# Patient Record
Sex: FEMALE | Race: WHITE | NOT HISPANIC OR LATINO | Employment: OTHER | URBAN - METROPOLITAN AREA
[De-identification: names, ages, dates, MRNs, and addresses within clinical notes are randomized per-mention and may not be internally consistent; named-entity substitution may affect disease eponyms.]

---

## 2019-02-25 ENCOUNTER — HISTORICAL (OUTPATIENT)
Dept: ADMINISTRATIVE | Facility: HOSPITAL | Age: 74
End: 2019-02-25

## 2021-03-12 ENCOUNTER — HISTORICAL (OUTPATIENT)
Dept: ADMINISTRATIVE | Facility: HOSPITAL | Age: 76
End: 2021-03-12

## 2022-03-18 ENCOUNTER — HISTORICAL (OUTPATIENT)
Dept: ADMINISTRATIVE | Facility: HOSPITAL | Age: 77
End: 2022-03-18

## 2022-06-23 ENCOUNTER — HOSPITAL ENCOUNTER (INPATIENT)
Facility: HOSPITAL | Age: 77
LOS: 6 days | Discharge: OTHER INSTITUTION | DRG: 639 | End: 2022-06-30
Attending: EMERGENCY MEDICINE | Admitting: INTERNAL MEDICINE
Payer: MEDICARE

## 2022-06-23 DIAGNOSIS — R73.9 HYPERGLYCEMIA: Primary | ICD-10-CM

## 2022-06-23 DIAGNOSIS — E11.65 UNCONTROLLED TYPE 2 DIABETES MELLITUS WITH HYPERGLYCEMIA: ICD-10-CM

## 2022-06-23 LAB
ALBUMIN SERPL-MCNC: 3.6 GM/DL (ref 3.4–4.8)
ALBUMIN/GLOB SERPL: 1 RATIO (ref 1.1–2)
ALP SERPL-CCNC: 159 UNIT/L (ref 40–150)
ALT SERPL-CCNC: 49 UNIT/L (ref 0–55)
APPEARANCE UR: CLEAR
AST SERPL-CCNC: 37 UNIT/L (ref 5–34)
BACTERIA #/AREA URNS AUTO: ABNORMAL /HPF
BASOPHILS # BLD AUTO: 0.06 X10(3)/MCL (ref 0–0.2)
BASOPHILS NFR BLD AUTO: 0.9 %
BILIRUB UR QL STRIP.AUTO: NEGATIVE MG/DL
BILIRUBIN DIRECT+TOT PNL SERPL-MCNC: 0.5 MG/DL
BUN SERPL-MCNC: 19.2 MG/DL (ref 9.8–20.1)
CALCIUM SERPL-MCNC: 9.8 MG/DL (ref 8.4–10.2)
CHLORIDE SERPL-SCNC: 89 MMOL/L (ref 98–107)
CO2 SERPL-SCNC: 25 MMOL/L (ref 23–31)
COLOR UR AUTO: YELLOW
CREAT SERPL-MCNC: 1.29 MG/DL (ref 0.55–1.02)
EOSINOPHIL # BLD AUTO: 0.05 X10(3)/MCL (ref 0–0.9)
EOSINOPHIL NFR BLD AUTO: 0.8 %
ERYTHROCYTE [DISTWIDTH] IN BLOOD BY AUTOMATED COUNT: 13 % (ref 11.5–17)
GLOBULIN SER-MCNC: 3.6 GM/DL (ref 2.4–3.5)
GLUCOSE SERPL-MCNC: 771 MG/DL (ref 82–115)
GLUCOSE UR QL STRIP.AUTO: ABNORMAL MG/DL
HCT VFR BLD AUTO: 42.5 % (ref 37–47)
HGB BLD-MCNC: 14 GM/DL (ref 12–16)
IMM GRANULOCYTES # BLD AUTO: 0.02 X10(3)/MCL (ref 0–0.02)
IMM GRANULOCYTES NFR BLD AUTO: 0.3 % (ref 0–0.43)
KETONES UR QL STRIP.AUTO: NEGATIVE MG/DL
LEUKOCYTE ESTERASE UR QL STRIP.AUTO: NEGATIVE UNIT/L
LYMPHOCYTES # BLD AUTO: 2.72 X10(3)/MCL (ref 0.6–4.6)
LYMPHOCYTES NFR BLD AUTO: 41.7 %
MCH RBC QN AUTO: 29.4 PG (ref 27–31)
MCHC RBC AUTO-ENTMCNC: 32.9 MG/DL (ref 33–36)
MCV RBC AUTO: 89.1 FL (ref 80–94)
MONOCYTES # BLD AUTO: 0.81 X10(3)/MCL (ref 0.1–1.3)
MONOCYTES NFR BLD AUTO: 12.4 %
NEUTROPHILS # BLD AUTO: 2.9 X10(3)/MCL (ref 2.1–9.2)
NEUTROPHILS NFR BLD AUTO: 43.9 %
NITRITE UR QL STRIP.AUTO: NEGATIVE
NRBC BLD AUTO-RTO: 0 %
PH UR STRIP.AUTO: 6.5 [PH]
PLATELET # BLD AUTO: 287 X10(3)/MCL (ref 130–400)
PMV BLD AUTO: 11 FL (ref 9.4–12.4)
POCT GLUCOSE: >500 MG/DL (ref 70–110)
POTASSIUM SERPL-SCNC: 4.2 MMOL/L (ref 3.5–5.1)
PROT SERPL-MCNC: 7.2 GM/DL (ref 5.8–7.6)
PROT UR QL STRIP.AUTO: NEGATIVE MG/DL
RBC # BLD AUTO: 4.77 X10(6)/MCL (ref 4.2–5.4)
RBC #/AREA URNS AUTO: <5 /HPF
RBC UR QL AUTO: NEGATIVE UNIT/L
SODIUM SERPL-SCNC: 127 MMOL/L (ref 136–145)
SP GR UR STRIP.AUTO: 1.02 (ref 1–1.03)
SQUAMOUS #/AREA URNS AUTO: <5 /HPF
UROBILINOGEN UR STRIP-ACNC: 0.2 MG/DL
WBC # SPEC AUTO: 6.5 X10(3)/MCL (ref 4.5–11.5)
WBC #/AREA URNS AUTO: 10 /HPF

## 2022-06-23 PROCEDURE — 63600175 PHARM REV CODE 636 W HCPCS: Performed by: EMERGENCY MEDICINE

## 2022-06-23 PROCEDURE — 85025 COMPLETE CBC W/AUTO DIFF WBC: CPT | Performed by: EMERGENCY MEDICINE

## 2022-06-23 PROCEDURE — 25000003 PHARM REV CODE 250: Performed by: EMERGENCY MEDICINE

## 2022-06-23 PROCEDURE — 36415 COLL VENOUS BLD VENIPUNCTURE: CPT | Performed by: INTERNAL MEDICINE

## 2022-06-23 PROCEDURE — 80053 COMPREHEN METABOLIC PANEL: CPT | Performed by: EMERGENCY MEDICINE

## 2022-06-23 PROCEDURE — 96361 HYDRATE IV INFUSION ADD-ON: CPT

## 2022-06-23 PROCEDURE — 96374 THER/PROPH/DIAG INJ IV PUSH: CPT

## 2022-06-23 PROCEDURE — 83036 HEMOGLOBIN GLYCOSYLATED A1C: CPT | Performed by: INTERNAL MEDICINE

## 2022-06-23 PROCEDURE — 82962 GLUCOSE BLOOD TEST: CPT

## 2022-06-23 PROCEDURE — 36415 COLL VENOUS BLD VENIPUNCTURE: CPT | Performed by: EMERGENCY MEDICINE

## 2022-06-23 PROCEDURE — 96372 THER/PROPH/DIAG INJ SC/IM: CPT | Performed by: EMERGENCY MEDICINE

## 2022-06-23 PROCEDURE — 99285 EMERGENCY DEPT VISIT HI MDM: CPT | Mod: 25

## 2022-06-23 PROCEDURE — 81001 URINALYSIS AUTO W/SCOPE: CPT | Performed by: EMERGENCY MEDICINE

## 2022-06-23 RX ORDER — SODIUM CHLORIDE, SODIUM LACTATE, POTASSIUM CHLORIDE, CALCIUM CHLORIDE 600; 310; 30; 20 MG/100ML; MG/100ML; MG/100ML; MG/100ML
500 INJECTION, SOLUTION INTRAVENOUS
Status: COMPLETED | OUTPATIENT
Start: 2022-06-23 | End: 2022-06-24

## 2022-06-23 RX ORDER — SODIUM CHLORIDE 9 MG/ML
500 INJECTION, SOLUTION INTRAVENOUS
Status: COMPLETED | OUTPATIENT
Start: 2022-06-23 | End: 2022-06-23

## 2022-06-23 RX ADMIN — SODIUM CHLORIDE 500 ML: 9 INJECTION, SOLUTION INTRAVENOUS at 08:06

## 2022-06-23 RX ADMIN — INSULIN HUMAN 10 UNITS: 100 INJECTION, SOLUTION PARENTERAL at 08:06

## 2022-06-23 RX ADMIN — SODIUM CHLORIDE, POTASSIUM CHLORIDE, SODIUM LACTATE AND CALCIUM CHLORIDE 500 ML: 600; 310; 30; 20 INJECTION, SOLUTION INTRAVENOUS at 10:06

## 2022-06-23 RX ADMIN — INSULIN HUMAN 20 UNITS: 100 INJECTION, SOLUTION PARENTERAL at 10:06

## 2022-06-24 PROBLEM — R73.9 HYPERGLYCEMIA: Status: ACTIVE | Noted: 2022-06-24

## 2022-06-24 LAB
ALBUMIN SERPL-MCNC: 3.4 GM/DL (ref 3.4–4.8)
ALBUMIN/GLOB SERPL: 1.1 RATIO (ref 1.1–2)
ALP SERPL-CCNC: 140 UNIT/L (ref 40–150)
ALT SERPL-CCNC: 44 UNIT/L (ref 0–55)
AST SERPL-CCNC: 41 UNIT/L (ref 5–34)
BASOPHILS # BLD AUTO: 0.04 X10(3)/MCL (ref 0–0.2)
BASOPHILS NFR BLD AUTO: 0.6 %
BILIRUBIN DIRECT+TOT PNL SERPL-MCNC: 0.5 MG/DL
BUN SERPL-MCNC: 14 MG/DL (ref 9.8–20.1)
CALCIUM SERPL-MCNC: 9.4 MG/DL (ref 8.4–10.2)
CHLORIDE SERPL-SCNC: 103 MMOL/L (ref 98–107)
CO2 SERPL-SCNC: 23 MMOL/L (ref 23–31)
CREAT SERPL-MCNC: 0.77 MG/DL (ref 0.55–1.02)
EOSINOPHIL # BLD AUTO: 0.13 X10(3)/MCL (ref 0–0.9)
EOSINOPHIL NFR BLD AUTO: 1.9 %
ERYTHROCYTE [DISTWIDTH] IN BLOOD BY AUTOMATED COUNT: 13.1 % (ref 11.5–17)
EST. AVERAGE GLUCOSE BLD GHB EST-MCNC: 349.4 MG/DL
GLOBULIN SER-MCNC: 3.2 GM/DL (ref 2.4–3.5)
GLUCOSE SERPL-MCNC: 276 MG/DL (ref 82–115)
HBA1C MFR BLD: 13.8 %
HCT VFR BLD AUTO: 42.5 % (ref 37–47)
HGB BLD-MCNC: 14.3 GM/DL (ref 12–16)
IMM GRANULOCYTES # BLD AUTO: 0.03 X10(3)/MCL (ref 0–0.02)
IMM GRANULOCYTES NFR BLD AUTO: 0.4 % (ref 0–0.43)
LYMPHOCYTES # BLD AUTO: 2.54 X10(3)/MCL (ref 0.6–4.6)
LYMPHOCYTES NFR BLD AUTO: 36.4 %
MCH RBC QN AUTO: 29.4 PG (ref 27–31)
MCHC RBC AUTO-ENTMCNC: 33.6 MG/DL (ref 33–36)
MCV RBC AUTO: 87.4 FL (ref 80–94)
MONOCYTES # BLD AUTO: 0.77 X10(3)/MCL (ref 0.1–1.3)
MONOCYTES NFR BLD AUTO: 11 %
NEUTROPHILS # BLD AUTO: 3.5 X10(3)/MCL (ref 2.1–9.2)
NEUTROPHILS NFR BLD AUTO: 49.7 %
NRBC BLD AUTO-RTO: 0 %
PLATELET # BLD AUTO: 267 X10(3)/MCL (ref 130–400)
PMV BLD AUTO: 10.9 FL (ref 9.4–12.4)
POCT GLUCOSE: 223 MG/DL (ref 70–110)
POCT GLUCOSE: 233 MG/DL (ref 70–110)
POCT GLUCOSE: 303 MG/DL (ref 70–110)
POCT GLUCOSE: 367 MG/DL (ref 70–110)
POCT GLUCOSE: 400 MG/DL (ref 70–110)
POCT GLUCOSE: 95 MG/DL (ref 70–110)
POCT GLUCOSE: >500 MG/DL (ref 70–110)
POCT GLUCOSE: >500 MG/DL (ref 70–110)
POTASSIUM SERPL-SCNC: 4.1 MMOL/L (ref 3.5–5.1)
PROT SERPL-MCNC: 6.6 GM/DL (ref 5.8–7.6)
RBC # BLD AUTO: 4.86 X10(6)/MCL (ref 4.2–5.4)
SARS-COV-2 RDRP RESP QL NAA+PROBE: NEGATIVE
SODIUM SERPL-SCNC: 137 MMOL/L (ref 136–145)
WBC # SPEC AUTO: 7 X10(3)/MCL (ref 4.5–11.5)

## 2022-06-24 PROCEDURE — 87635 SARS-COV-2 COVID-19 AMP PRB: CPT | Performed by: INTERNAL MEDICINE

## 2022-06-24 PROCEDURE — 25000003 PHARM REV CODE 250: Performed by: INTERNAL MEDICINE

## 2022-06-24 PROCEDURE — 63600175 PHARM REV CODE 636 W HCPCS: Performed by: INTERNAL MEDICINE

## 2022-06-24 PROCEDURE — 85025 COMPLETE CBC W/AUTO DIFF WBC: CPT | Performed by: INTERNAL MEDICINE

## 2022-06-24 PROCEDURE — 80053 COMPREHEN METABOLIC PANEL: CPT | Performed by: INTERNAL MEDICINE

## 2022-06-24 PROCEDURE — C9399 UNCLASSIFIED DRUGS OR BIOLOG: HCPCS | Performed by: INTERNAL MEDICINE

## 2022-06-24 PROCEDURE — 11000001 HC ACUTE MED/SURG PRIVATE ROOM

## 2022-06-24 PROCEDURE — 36415 COLL VENOUS BLD VENIPUNCTURE: CPT | Performed by: INTERNAL MEDICINE

## 2022-06-24 RX ORDER — ACETAMINOPHEN 325 MG/1
650 TABLET ORAL EVERY 8 HOURS PRN
Status: DISCONTINUED | OUTPATIENT
Start: 2022-06-24 | End: 2022-06-30 | Stop reason: HOSPADM

## 2022-06-24 RX ORDER — IBUPROFEN 200 MG
24 TABLET ORAL
Status: DISCONTINUED | OUTPATIENT
Start: 2022-06-24 | End: 2022-06-24

## 2022-06-24 RX ORDER — PRAMIPEXOLE DIHYDROCHLORIDE 0.75 MG/1
0.75 TABLET ORAL 3 TIMES DAILY
COMMUNITY

## 2022-06-24 RX ORDER — SODIUM CHLORIDE 0.9 % (FLUSH) 0.9 %
10 SYRINGE (ML) INJECTION
Status: DISCONTINUED | OUTPATIENT
Start: 2022-06-24 | End: 2022-06-30 | Stop reason: HOSPADM

## 2022-06-24 RX ORDER — TRAZODONE HYDROCHLORIDE 50 MG/1
50 TABLET ORAL NIGHTLY
COMMUNITY

## 2022-06-24 RX ORDER — ACETAMINOPHEN, DIPHENHYDRAMINE HCL, PHENYLEPHRINE HCL 325; 25; 5 MG/1; MG/1; MG/1
TABLET ORAL
COMMUNITY

## 2022-06-24 RX ORDER — TRAZODONE HYDROCHLORIDE 50 MG/1
50 TABLET ORAL NIGHTLY
Status: DISCONTINUED | OUTPATIENT
Start: 2022-06-24 | End: 2022-06-30 | Stop reason: HOSPADM

## 2022-06-24 RX ORDER — DEXTROSE MONOHYDRATE 100 MG/ML
12.5 INJECTION, SOLUTION INTRAVENOUS
Status: DISCONTINUED | OUTPATIENT
Start: 2022-06-24 | End: 2022-06-24

## 2022-06-24 RX ORDER — GLUCAGON 1 MG
1 KIT INJECTION
Status: DISCONTINUED | OUTPATIENT
Start: 2022-06-24 | End: 2022-06-24

## 2022-06-24 RX ORDER — DEXTROSE MONOHYDRATE 100 MG/ML
25 INJECTION, SOLUTION INTRAVENOUS
Status: DISCONTINUED | OUTPATIENT
Start: 2022-06-24 | End: 2022-06-30 | Stop reason: HOSPADM

## 2022-06-24 RX ORDER — CITALOPRAM 20 MG/1
40 TABLET, FILM COATED ORAL DAILY
Status: DISCONTINUED | OUTPATIENT
Start: 2022-06-24 | End: 2022-06-30 | Stop reason: HOSPADM

## 2022-06-24 RX ORDER — INSULIN ASPART 100 [IU]/ML
INJECTION, SOLUTION INTRAVENOUS; SUBCUTANEOUS
Status: CANCELLED | OUTPATIENT
Start: 2022-06-25

## 2022-06-24 RX ORDER — TALC
6 POWDER (GRAM) TOPICAL NIGHTLY PRN
Status: DISCONTINUED | OUTPATIENT
Start: 2022-06-24 | End: 2022-06-30 | Stop reason: HOSPADM

## 2022-06-24 RX ORDER — INSULIN ASPART 100 [IU]/ML
1-10 INJECTION, SOLUTION INTRAVENOUS; SUBCUTANEOUS
Status: DISCONTINUED | OUTPATIENT
Start: 2022-06-24 | End: 2022-06-30 | Stop reason: HOSPADM

## 2022-06-24 RX ORDER — IBUPROFEN 200 MG
16 TABLET ORAL
Status: DISCONTINUED | OUTPATIENT
Start: 2022-06-24 | End: 2022-06-24

## 2022-06-24 RX ORDER — ENOXAPARIN SODIUM 100 MG/ML
40 INJECTION SUBCUTANEOUS EVERY 24 HOURS
Status: DISCONTINUED | OUTPATIENT
Start: 2022-06-24 | End: 2022-06-30 | Stop reason: HOSPADM

## 2022-06-24 RX ORDER — GLUCAGON 1 MG
1 KIT INJECTION
Status: DISCONTINUED | OUTPATIENT
Start: 2022-06-24 | End: 2022-06-30 | Stop reason: HOSPADM

## 2022-06-24 RX ORDER — CITALOPRAM 40 MG/1
40 TABLET, FILM COATED ORAL DAILY
COMMUNITY

## 2022-06-24 RX ORDER — PANTOPRAZOLE SODIUM 40 MG/1
40 TABLET, DELAYED RELEASE ORAL DAILY
Status: DISCONTINUED | OUTPATIENT
Start: 2022-06-24 | End: 2022-06-30 | Stop reason: HOSPADM

## 2022-06-24 RX ORDER — DEXTROSE MONOHYDRATE 100 MG/ML
12.5 INJECTION, SOLUTION INTRAVENOUS
Status: DISCONTINUED | OUTPATIENT
Start: 2022-06-24 | End: 2022-06-30 | Stop reason: HOSPADM

## 2022-06-24 RX ORDER — HYDROXYZINE HYDROCHLORIDE 25 MG/1
25 TABLET, FILM COATED ORAL 3 TIMES DAILY PRN
COMMUNITY

## 2022-06-24 RX ORDER — INSULIN ASPART 100 [IU]/ML
1-10 INJECTION, SOLUTION INTRAVENOUS; SUBCUTANEOUS
Status: DISCONTINUED | OUTPATIENT
Start: 2022-06-24 | End: 2022-06-24

## 2022-06-24 RX ORDER — PANTOPRAZOLE SODIUM 40 MG/1
40 TABLET, DELAYED RELEASE ORAL DAILY
COMMUNITY

## 2022-06-24 RX ORDER — PRAMIPEXOLE DIHYDROCHLORIDE 0.25 MG/1
0.75 TABLET ORAL 3 TIMES DAILY
Status: DISCONTINUED | OUTPATIENT
Start: 2022-06-24 | End: 2022-06-30 | Stop reason: HOSPADM

## 2022-06-24 RX ORDER — DEXTROSE MONOHYDRATE 100 MG/ML
25 INJECTION, SOLUTION INTRAVENOUS
Status: DISCONTINUED | OUTPATIENT
Start: 2022-06-24 | End: 2022-06-24

## 2022-06-24 RX ORDER — SODIUM CHLORIDE, SODIUM LACTATE, POTASSIUM CHLORIDE, CALCIUM CHLORIDE 600; 310; 30; 20 MG/100ML; MG/100ML; MG/100ML; MG/100ML
INJECTION, SOLUTION INTRAVENOUS CONTINUOUS
Status: ACTIVE | OUTPATIENT
Start: 2022-06-24 | End: 2022-06-24

## 2022-06-24 RX ORDER — IBUPROFEN 200 MG
24 TABLET ORAL
Status: DISCONTINUED | OUTPATIENT
Start: 2022-06-24 | End: 2022-06-30 | Stop reason: HOSPADM

## 2022-06-24 RX ORDER — IBUPROFEN 200 MG
16 TABLET ORAL
Status: DISCONTINUED | OUTPATIENT
Start: 2022-06-24 | End: 2022-06-30 | Stop reason: HOSPADM

## 2022-06-24 RX ORDER — INSULIN ASPART 100 [IU]/ML
5 INJECTION, SOLUTION INTRAVENOUS; SUBCUTANEOUS
Status: DISCONTINUED | OUTPATIENT
Start: 2022-06-24 | End: 2022-06-24

## 2022-06-24 RX ADMIN — PRAMIPEXOLE DIHYDROCHLORIDE 0.75 MG: 0.25 TABLET ORAL at 10:06

## 2022-06-24 RX ADMIN — ENOXAPARIN SODIUM 40 MG: 40 INJECTION SUBCUTANEOUS at 04:06

## 2022-06-24 RX ADMIN — CITALOPRAM HYDROBROMIDE 40 MG: 20 TABLET ORAL at 03:06

## 2022-06-24 RX ADMIN — ACETAMINOPHEN 650 MG: 325 TABLET, FILM COATED ORAL at 11:06

## 2022-06-24 RX ADMIN — TRAZODONE HYDROCHLORIDE 50 MG: 50 TABLET ORAL at 10:06

## 2022-06-24 RX ADMIN — INSULIN ASPART 10 UNITS: 100 INJECTION, SOLUTION INTRAVENOUS; SUBCUTANEOUS at 11:06

## 2022-06-24 RX ADMIN — PRAMIPEXOLE DIHYDROCHLORIDE 0.75 MG: 0.25 TABLET ORAL at 03:06

## 2022-06-24 RX ADMIN — INSULIN DETEMIR 60 UNITS: 100 INJECTION, SOLUTION SUBCUTANEOUS at 10:06

## 2022-06-24 RX ADMIN — PANTOPRAZOLE SODIUM 40 MG: 40 TABLET, DELAYED RELEASE ORAL at 03:06

## 2022-06-24 RX ADMIN — SODIUM CHLORIDE, POTASSIUM CHLORIDE, SODIUM LACTATE AND CALCIUM CHLORIDE: 600; 310; 30; 20 INJECTION, SOLUTION INTRAVENOUS at 02:06

## 2022-06-24 NOTE — ED PROVIDER NOTES
Encounter Date: 6/23/2022    SCRIBE #1 NOTE: I, Vaughn Giraldo, am scribing for, and in the presence of,  Carlos Mendes III, MD. I have scribed the following portions of the note - Other sections scribed: HPI, ROS, PE.       History     Chief Complaint   Patient presents with    Hyperglycemia     EMSReports elevated 's x 3 days, EMS reports CBG HIGH. Pt. Reports polydipsia and polyuria. Pmh Dementia.     A 78 y/o female with a history of dementia and DM presents to [facility] with elevated CBG levels for the past 3 days. Pt's relative reports that the pt was off balance, tremulous, and mentally altered today, but pt's relative states that this is normal for when the pt's CBG remains elevated. Pt's associated symptoms are polydipsia and polyuria. Per pt's relative, the pt is undergoing the assisted living process at the moment.     Internist: Anatoly Priest MD    The history is provided by a relative. History limited by: dementia.   Diabetes  This is a chronic problem. Her disease course has been fluctuating. Associated symptoms include polydipsia and polyuria. Symptoms have been present for 3 days. Hypoglycemia symptoms include tremors. (Off balance) Risk factors for coronary artery disease include diabetes mellitus. She is compliant with treatment some of the time. Her overall blood glucose range is >200 mg/dl.     Review of patient's allergies indicates:   Allergen Reactions    Hydrocodone-acetaminophen      Other reaction(s): itch, rash    Hydromorphone     Penicillins Itching     Other reaction(s): itch, rash    Tetracycline      Other reaction(s): itch,rash    Levofloxacin Rash     Past Medical History:   Diagnosis Date    Diabetes mellitus     Hypertension      History reviewed. No pertinent surgical history.  History reviewed. No pertinent family history.  Social History     Substance Use Topics    Alcohol use: Not Currently     Review of Systems   Unable to perform ROS: Dementia    Endocrine: Positive for polydipsia and polyuria.   Neurological: Positive for tremors.       Physical Exam     Initial Vitals [06/23/22 1820]   BP Pulse Resp Temp SpO2   (!) 158/86 72 20 97.5 °F (36.4 °C) 98 %      MAP       --         Physical Exam    Nursing note and vitals reviewed.  Constitutional: No distress.   HENT:   Head: Normocephalic and atraumatic.   Edentulous   Neck: Trachea normal.   Cardiovascular: Normal rate and regular rhythm.   No murmur heard.  Pulmonary/Chest: Breath sounds normal. No respiratory distress.   Abdominal: Abdomen is soft. Bowel sounds are normal. She exhibits no distension. There is no abdominal tenderness.   Musculoskeletal:         General: Normal range of motion.      Lumbar back: Normal range of motion.     Neurological: She is alert and oriented to person, place, and time. She has normal strength. No cranial nerve deficit.   Skin: Skin is warm and dry. No rash noted.   Psychiatric: She has a normal mood and affect. Judgment normal.         ED Course   Procedures  Labs Reviewed   COMPREHENSIVE METABOLIC PANEL - Abnormal; Notable for the following components:       Result Value    Sodium Level 127 (*)     Chloride 89 (*)     Glucose Level 771 (*)     Creatinine 1.29 (*)     Globulin 3.6 (*)     Albumin/Globulin Ratio 1.0 (*)     Alkaline Phosphatase 159 (*)     Aspartate Aminotransferase 37 (*)     All other components within normal limits   URINALYSIS, REFLEX TO URINE CULTURE - Abnormal; Notable for the following components:    Glucose, UA 3+ (*)     All other components within normal limits   CBC WITH DIFFERENTIAL - Abnormal; Notable for the following components:    MCHC 32.9 (*)     IG# 0.02 (*)     All other components within normal limits   URINALYSIS, MICROSCOPIC - Abnormal; Notable for the following components:    WBC, UA 10 (*)     All other components within normal limits   POCT GLUCOSE - Abnormal; Notable for the following components:    POCT Glucose >500 (*)      All other components within normal limits   CBC W/ AUTO DIFFERENTIAL    Narrative:     The following orders were created for panel order CBC auto differential.  Procedure                               Abnormality         Status                     ---------                               -----------         ------                     CBC with Differential[[id number]]        Abnormal            Final result                 Please view results for these tests on the individual orders.   POCT GLUCOSE MONITORING CONTINUOUS          Imaging Results    None          Medications   0.9%  NaCl infusion (0 mLs Intravenous Stopped 6/23/22 2131)   insulin regular injection 10 Units 0.1 mL (10 Units Intravenous Given 6/23/22 2015)   insulin regular injection 20 Units 0.2 mL (20 Units Subcutaneous Given 6/23/22 2224)   lactated ringers infusion (500 mLs Intravenous New Bag 6/23/22 2253)              Scribe Attestation:   Scribe #1: I performed the above scribed service and the documentation accurately describes the services I performed. I attest to the accuracy of the note.    Attending Attestation:           Physician Attestation for Scribe:  Physician Attestation Statement for Scribe #1: I, Carlos Mendes III, MD, reviewed documentation, as scribed by Vaughn Giraldo in my presence, and it is both accurate and complete.                      Clinical Impression:   Final diagnoses:  [R73.9] Hyperglycemia (Primary)  [E11.65] Uncontrolled type 2 diabetes mellitus with hyperglycemia          ED Disposition Condition    Discharge Stable        ED Prescriptions     None        Follow-up Information    None          Carlos Mendes III, MD  06/24/22 0020

## 2022-06-24 NOTE — CLINICAL REVIEW
77-year-old female admitted to acute care setting on 6/23/2022 with hyperglycemia.  Previous history includes mild dementia, evidence mellitus type II insulin requiring, hypertension, insomnia.  She remains hemodynamically stable, afebrile.  The patient continues with significant blood glucose elevations in the 300s to 400s.  She requires subcutaneous short acting and long-acting insulin monitoring and readjustments.  Requires further diabetic teachings for her and family given the uncontrolled diabetes mellitus.  It is expected that with the acute 4-hour Accu-Cheks, diabetic teachings, neuro monitoring, it is expected that her hospitalization will extend beyond 2 midnights and she is appropriate for an IP LOC    MD LEANDRA  , Physician Advisor

## 2022-06-24 NOTE — PROGRESS NOTES
"Nutrition   Progress Note      Recommendations:  1. Continue diabetic diet      Reason for Evaluation:  Identified at risk by screening criteria    Diagnosis:    1. Hyperglycemia    2. Uncontrolled type 2 diabetes mellitus with hyperglycemia        Relevant Medical History:    Past Medical History:   Diagnosis Date    Diabetes mellitus     Hypertension          Nutrition Diet History:    Factors affecting nutritional intake: none identified at this time        Nutrition Prescription Ordered:    Current Diet Order: Diabetic diet    Appetite:  good    PO intake: 75 - 100 %      Labs / Medications / Procedures:    Nutrition Related Medications: Insulin     Nutrition Related Labs:  6/24: Gluc 276      Anthropometrics:  Height: 5' 4" (1.626 m)  Admit Weight:  Weight: 72.6 kg (160 lb)  Latest Weight:  72.6 kg (160 lb)  Wt Readings from Last 5 Encounters:   06/23/22 72.6 kg (160 lb)     IBW: 54.54kg   %IBW: 133%  UBW: 72.72kg (160 lbs)   %Weight Change: -0.16  Body mass index is 27.46 kg/m².  BMI classification:  Normal (BMI 18.5 - 24.9)      Nutrition Narrative:  6/24: Pt states good PO intakes. Provided diabetic diet education + education materials.     Monitoring and Evaluation:    Nutrition Monitoring and Evaluation:  food and beverage intake    Nutrition Risk:  Level of Nutrition Risk:  Low  Frequency of Follow up:  Dietitian will f/up within 7 days.          "

## 2022-06-24 NOTE — NURSING
Called hospitalist group again for NP on-call for assistance with insulin orders. Awaiting a callback.

## 2022-06-24 NOTE — H&P
[facility] Hospital Medicine History & Physical Examination       Patient Name: Jamie Barnes  MRN: [id number]  Patient Class: Emergency   Admission Date: 6/23/2022  6:23 PM  Length of Stay: 0  Admitting Service: Hospital Medicine   Attending Physician: Koko Mays MD   Primary Care Provider: Anatoly Priest MD  History source: EMR, patient and/or patient's family    CHIEF COMPLAINT   Hyperglycemia     HISTORY OF PRESENT ILLNESS:   77-year-old female with IDDM 2 presents to the ER with complaints of persistent hyperglycemia.  She has a history of mild dementia, IDDM 2, hypertension insomnia and her daughter at bedside this is greatly with the history.  She explains that her PCP is attempting to increase her outpatient regimen of Lantus which she takes 30 units in the morning for quite some time but he recently increased to 60 units in the morning last week.  Despite this change she continues to have readings that are over 400 and often times just read as too high to read.  She does not take short-acting insulin.  On presentation to the ER she was afebrile hemodynamically stable but her blood glucose was noted to be 700 which did improve with administration of subcutaneous insulin.  Daughter at bedside is concerned because she has to drive to her house every day to administer her morning insulin and she stays there throughout the day to monitor blood sugar.  She also states that the patient does sometimes become symptomatic when her blood sugars in the 200s as if she is lethargic.    PAST MEDICAL HISTORY:   Dementia  Hypertension  Insulin-dependent type 2 diabetes mellitus  Insomnia    PAST SURGICAL HISTORY:   Carotid endarterectomy    ALLERGIES:   Hydrocodone-acetaminophen, Hydromorphone, Penicillins, Tetracycline, and Levofloxacin    FAMILY HISTORY:   Reviewed and non-contributory     SOCIAL HISTORY:   Denies tobacco drug or alcohol use    HOME MEDICATIONS:   Awaiting home  med rec    REVIEW OF SYSTEMS:   Except as documented, all other systems reviewed and negative     PHYSICAL EXAM:   T 98.1 °F (36.7 °C)   /77   P 81   RR 13   O2 98 %  GENERAL: awake, alert, oriented and in no acute distress, non-toxic appearing   HEENT: normocephalic atraumatic   NECK: supple   LUNGS: Clear bilaterally, no wheezing or rales, no accessory muscle use   CVS: Regular rate and rhythm, normal peripheral perfusion  ABD: Soft, non-tender, non-distended, bowel sounds present  EXTREMITIES: no clubbing or cyanosis  SKIN: Warm, dry.   NEURO: alert and oriented, grossly without focal deficits   PSYCHIATRIC: Cooperative    LABS AND IMAGING:     Recent Labs     06/23/22 2024   WBC 6.5   RBC 4.77   HGB 14.0   HCT 42.5   MCV 89.1   MCH 29.4   MCHC 32.9*   RDW 13.0        No results for input(s): LACTIC in the last 72 hours.  No results for input(s): INR, APTT, D-DIMER in the last 72 hours.   Recent Labs     06/23/22 2024   *   K 4.2   CHLORIDE 89*   CO2 25   BUN 19.2   CREATININE 1.29*   GLUCOSE 771*   CALCIUM 9.8   ALBUMIN 3.6   GLOBULIN 3.6*   ALKPHOS 159*   ALT 49   AST 37*   BILITOT 0.5        X-Ray Chest PA And Lateral  No acute cardiopulmonary process identified.         ASSESSMENT & PLAN:   Persistent severe hyperglycemia  Poorly-controlled IDDM 2  History dementia, IDDM 2, HTN, insomnia    - obtain a hemoglobin A1c  - Start t.i.d. short-acting insulin in addition to long-acting insulin  - monitor for symptoms of hypoglycemia, close BG monitoring   - diabetic Education  - case management consult for home assistance/possible placement     DVT prophylaxis: lovenox  Code status: full     If patient was admitted under observational status it is with my approval/permission.     At least 55 min was spent on this history and physical.  Time seen: 1205AM   Koko Mays MD

## 2022-06-25 LAB
POCT GLUCOSE: 211 MG/DL (ref 70–110)
POCT GLUCOSE: 276 MG/DL (ref 70–110)
POCT GLUCOSE: 340 MG/DL (ref 70–110)
POCT GLUCOSE: 430 MG/DL (ref 70–110)

## 2022-06-25 PROCEDURE — 97161 PT EVAL LOW COMPLEX 20 MIN: CPT

## 2022-06-25 PROCEDURE — 63600175 PHARM REV CODE 636 W HCPCS: Performed by: PHYSICIAN ASSISTANT

## 2022-06-25 PROCEDURE — 63600175 PHARM REV CODE 636 W HCPCS: Performed by: INTERNAL MEDICINE

## 2022-06-25 PROCEDURE — 25000003 PHARM REV CODE 250: Performed by: INTERNAL MEDICINE

## 2022-06-25 PROCEDURE — C9399 UNCLASSIFIED DRUGS OR BIOLOG: HCPCS | Performed by: INTERNAL MEDICINE

## 2022-06-25 PROCEDURE — 11000001 HC ACUTE MED/SURG PRIVATE ROOM

## 2022-06-25 RX ADMIN — PRAMIPEXOLE DIHYDROCHLORIDE 0.75 MG: 0.25 TABLET ORAL at 02:06

## 2022-06-25 RX ADMIN — PRAMIPEXOLE DIHYDROCHLORIDE 0.75 MG: 0.25 TABLET ORAL at 09:06

## 2022-06-25 RX ADMIN — INSULIN ASPART 6 UNITS: 100 INJECTION, SOLUTION INTRAVENOUS; SUBCUTANEOUS at 11:06

## 2022-06-25 RX ADMIN — TRAZODONE HYDROCHLORIDE 50 MG: 50 TABLET ORAL at 10:06

## 2022-06-25 RX ADMIN — INSULIN ASPART 2 UNITS: 100 INJECTION, SOLUTION INTRAVENOUS; SUBCUTANEOUS at 06:06

## 2022-06-25 RX ADMIN — PRAMIPEXOLE DIHYDROCHLORIDE 0.75 MG: 0.25 TABLET ORAL at 10:06

## 2022-06-25 RX ADMIN — ENOXAPARIN SODIUM 40 MG: 40 INJECTION SUBCUTANEOUS at 03:06

## 2022-06-25 RX ADMIN — INSULIN DETEMIR 60 UNITS: 100 INJECTION, SOLUTION SUBCUTANEOUS at 10:06

## 2022-06-25 RX ADMIN — PANTOPRAZOLE SODIUM 40 MG: 40 TABLET, DELAYED RELEASE ORAL at 09:06

## 2022-06-25 RX ADMIN — INSULIN ASPART 10 UNITS: 100 INJECTION, SOLUTION INTRAVENOUS; SUBCUTANEOUS at 03:06

## 2022-06-25 RX ADMIN — CITALOPRAM HYDROBROMIDE 40 MG: 20 TABLET ORAL at 09:06

## 2022-06-25 NOTE — PT/OT/SLP EVAL
Physical Therapy Evaluation    Patient Name:  Jamie Barnes   MRN:  [id number]    Recommendations:     Discharge Recommendations:      Discharge Equipment Recommendations:     Barriers to discharge: decreased fxnl mobility    Assessment:     Jamie Barnes is a 77 y.o. female admitted with a medical diagnosis of Hyperglycemia.  She presents with the following impairments/functional limitations:  weakness, impaired functional mobilty .    Rehab Prognosis: Good; patient would benefit from acute skilled PT services to address these deficits and reach maximum level of function.    Recent Surgery: * No surgery found *      Plan:     During this hospitalization, patient to be seen daily to address the identified rehab impairments via gait training, therapeutic activities, therapeutic exercises and progress toward the following goals:    · Plan of Care Expires:  06/25/22    Subjective     Chief Complaint: weakness  Patient/Family Comments/goals:   Pain/Comfort:  ·      Patients cultural, spiritual, Scientologist conflicts given the current situation:      Living Environment:  Lives with sitters day/night  Prior to admission, patients level of function was supervision.  Equipment used at home:  .  DME owned (not currently used): .  Upon discharge, patient will have assistance from sitters    Objective:     Communicated with nurse prior to session.  Patient found supine with    upon PT entry to room.    General Precautions: Standard,     Orthopedic Precautions:    Braces:    Respiratory Status: Room air    Exams:  · RLE ROM: WFL  · RLE Strength: 3+/5  · LLE ROM: WFL  · LLE Strength: 3+/5    Functional Mobility:  · Bed Mobility:     · Rolling Left:  stand by assistance  · Rolling Right: stand by assistance  · Scooting: stand by assistance  · Supine to Sit: minimum assistance  · Sit to Supine: minimum assistance  · Transfers:     · Sit to Stand:  minimum assistance with rolling walker  · Bed to Chair: minimum assistance with   rolling walker  using  Stand Pivot  · Gait: amb 10ft with rw with min/cga    Therapeutic Activities and Exercises:       AM-PAC 6 CLICK MOBILITY  Total Score:17     Patient left supine with call button in reach.    GOALS:   Multidisciplinary Problems     Physical Therapy Goals        Problem: Physical Therapy    Goal Priority Disciplines Outcome Goal Variances Interventions   Physical Therapy Goal     PT, PT/OT      Description: LTGs  1. Pt will be ind with bed mobilty  2. Pt will be ind with transfers with rw  3. Pt will be sba with rw 100ft                   History:     Past Medical History:   Diagnosis Date    Diabetes mellitus     Hypertension        History reviewed. No pertinent surgical history.    Time Tracking:     PT Received On:    PT Start Time: 1350     PT Stop Time: 1412  PT Total Time (min): 22 min     Billable Minutes: Evaluation 22 06/25/2022

## 2022-06-25 NOTE — PROGRESS NOTES
[facility] Progress [facility]        Chief Complaint: Inpatient follow-up on uncontrolled diabetes mellitus    HPI:   Patient is a 77-year-old white female with IDDM 2 presents to the ER with complaints of persistent hyperglycemia.  She has a history of mild dementia, IDDM 2, hypertension , insomnia and her daughter at bedside assists greatly with the history.  She explains that her PCP is attempting to increase her outpatient regimen of Lantus.  She has been taking 30 units in the morning for quite some time but he recently increased to 60 units in the morning last week.  Despite this change she continues to have readings that are over 400 and often times just read as too high to read.  She does not take short-acting insulin.  On presentation to the ER she was afebrile and hemodynamically stable but her blood glucose was noted to be 700 which did improve with administration of subcutaneous insulin.  Daughter at bedside is concerned because she has to drive to her house every day to administer her morning insulin and she stays there throughout the day to monitor blood sugar.  She also states that the patient does sometimes become lethargic when her blood glucose level drops to the 200s.    Interval Hx:   Patient is sitting up in bed awake and alert having just eaten lunch.  She is without any acute complaints.  Her daughter and son-in-law are present in the room.  Patient is afebrile, on room air, and hemodynamically stable.  Her capillary blood glucose levels are improved but still suboptimally controlled.  Her daughter showed me paperwork showing that her primary care provider Dr. Anatoly Priest has been trying to get her admitted to [facility] due to failure to thrive.  She has been in and out of the hospital over the past few months with uncontrolled blood glucose levels and her diabetic regimen has been changed multiple times with the last change  being an increase to Lantus 60 units daily.    Objective/physical exam:  General:  Elderly obese white female in no acute distress  HENT: normocephalic, atraumatic  Eye: PERRL, EOMI, clear conjunctiva  Neck: full ROM, no thyromegaly, no JVD  Respiratory: clear to auscultation bilaterally  Cardiovascular: regular rate and rhythm  Gastrointestinal: non-distended, positive bowel sounds, non-tender  Musculoskeletal: no gross deformity  Integumentary: warm, dry, intact, no rashes  Neurological: cranial nerves grossly intact, no focal neurological deficit  Psychiatric: cooperative, poor insight into medical condition      VITAL SIGNS: 24 HRS MIN & MAX LAST   Temp  Min: 97.3 °F (36.3 °C)  Max: 98.2 °F (36.8 °C) 97.3 °F (36.3 °C)   BP  Min: 123/72  Max: 168/90 123/72   Pulse  Min: 82  Max: 94  84   Resp  Min: 17  Max: 18 18   SpO2  Min: 92 %  Max: 98 % 96 %       Recent Labs   Lab 06/23/22 2024 06/24/22  0607   WBC 6.5 7.0   RBC 4.77 4.86   HGB 14.0 14.3   HCT 42.5 42.5   MCV 89.1 87.4   MCH 29.4 29.4   MCHC 32.9* 33.6   RDW 13.0 13.1    267   MPV 11.0 10.9       Recent Labs   Lab 06/23/22 2024 06/24/22  0607   * 137   K 4.2 4.1   CO2 25 23   BUN 19.2 14.0   CREATININE 1.29* 0.77   CALCIUM 9.8 9.4   ALBUMIN 3.6 3.4   ALKPHOS 159* 140   ALT 49 44   AST 37* 41*   BILITOT 0.5 0.5          Microbiology Results (last 7 days)     ** No results found for the last 168 hours. **           See below for Radiology    Scheduled Med:   citalopram  40 mg Oral Daily    enoxaparin  40 mg Subcutaneous Daily    insulin detemir U-100  60 Units Subcutaneous BID    pantoprazole  40 mg Oral Daily    pramipexole  0.75 mg Oral TID    traZODone  50 mg Oral QHS        Continuous Infusions:  None.    PRN Meds:  acetaminophen, dextrose 10 % in water (D10W), dextrose 10 % in water (D10W), dextrose 10%, dextrose 10%, glucagon (human recombinant), glucose, glucose, insulin aspart U-100, melatonin, sodium chloride 0.9%        Assessment/Plan:  Insulin-dependent type 2 diabetes mellitus, uncontrolled  Dementia  Failure to thrive      Plan:  Increase Lantus to 60 units twice daily.  I would actually like to place the patient on Humulin 70/30 twice daily but unfortunately it is unavailable at our facility.  Place a consultation to Physical therapy and Occupational therapy  Place a consultation to Case Management for skilled nursing facility placement  Findings and plan were discussed with the patient and her daughter in the room    Patient condition:  Stable      Anticipated discharge and Disposition:  Skilled nursing facility    All diagnosis and differential diagnosis have been reviewed; assessment and plan has been documented; I have personally reviewed the labs and test results that are presently available; I have reviewed the patients medication list; I have reviewed the consulting providers response and recommendations. I have reviewed or attempted to review medical records based upon their availability    All of the patient's questions have been  addressed and answered. Patient's is agreeable to the above stated plan. I will continue to monitor closely and make adjustments to medical management as needed.  _____________________________________________________________________    Nutrition Status:    Radiology:  X-Ray Chest PA And Lateral     CLINICAL:  Cough.     COMPARISON: August 1, 2021.     FINDINGS:  Cardiopericardial silhouette is within normal limits.  No  acute dense focal or segmental consolidation, congestion, pleural  effusion or pneumothorax.       IMPRESSION:     No acute cardiopulmonary process identified.       Electronically Signed By: Evan Meza MD  Date/Time Signed: 03/18/2022 10:37      Tye Priest MD   06/25/2022

## 2022-06-26 LAB
POCT GLUCOSE: 223 MG/DL (ref 70–110)
POCT GLUCOSE: 272 MG/DL (ref 70–110)
POCT GLUCOSE: 297 MG/DL (ref 70–110)
POCT GLUCOSE: 299 MG/DL (ref 70–110)

## 2022-06-26 PROCEDURE — 63600175 PHARM REV CODE 636 W HCPCS: Performed by: INTERNAL MEDICINE

## 2022-06-26 PROCEDURE — 25000003 PHARM REV CODE 250: Performed by: INTERNAL MEDICINE

## 2022-06-26 PROCEDURE — C9399 UNCLASSIFIED DRUGS OR BIOLOG: HCPCS | Performed by: INTERNAL MEDICINE

## 2022-06-26 PROCEDURE — 11000001 HC ACUTE MED/SURG PRIVATE ROOM

## 2022-06-26 PROCEDURE — 63600175 PHARM REV CODE 636 W HCPCS: Performed by: PHYSICIAN ASSISTANT

## 2022-06-26 RX ADMIN — PRAMIPEXOLE DIHYDROCHLORIDE 0.75 MG: 0.25 TABLET ORAL at 02:06

## 2022-06-26 RX ADMIN — CITALOPRAM HYDROBROMIDE 40 MG: 20 TABLET ORAL at 08:06

## 2022-06-26 RX ADMIN — ENOXAPARIN SODIUM 40 MG: 40 INJECTION SUBCUTANEOUS at 04:06

## 2022-06-26 RX ADMIN — INSULIN DETEMIR 60 UNITS: 100 INJECTION, SOLUTION SUBCUTANEOUS at 08:06

## 2022-06-26 RX ADMIN — PANTOPRAZOLE SODIUM 40 MG: 40 TABLET, DELAYED RELEASE ORAL at 08:06

## 2022-06-26 RX ADMIN — PRAMIPEXOLE DIHYDROCHLORIDE 0.75 MG: 0.25 TABLET ORAL at 09:06

## 2022-06-26 RX ADMIN — ACETAMINOPHEN 650 MG: 325 TABLET, FILM COATED ORAL at 02:06

## 2022-06-26 RX ADMIN — INSULIN ASPART 4 UNITS: 100 INJECTION, SOLUTION INTRAVENOUS; SUBCUTANEOUS at 06:06

## 2022-06-26 RX ADMIN — PRAMIPEXOLE DIHYDROCHLORIDE 0.75 MG: 0.25 TABLET ORAL at 08:06

## 2022-06-26 RX ADMIN — INSULIN DETEMIR 80 UNITS: 100 INJECTION, SOLUTION SUBCUTANEOUS at 09:06

## 2022-06-26 RX ADMIN — TRAZODONE HYDROCHLORIDE 50 MG: 50 TABLET ORAL at 09:06

## 2022-06-26 RX ADMIN — INSULIN ASPART 6 UNITS: 100 INJECTION, SOLUTION INTRAVENOUS; SUBCUTANEOUS at 11:06

## 2022-06-26 RX ADMIN — INSULIN ASPART 6 UNITS: 100 INJECTION, SOLUTION INTRAVENOUS; SUBCUTANEOUS at 04:06

## 2022-06-26 NOTE — PROGRESS NOTES
[facility] Medicine Progress Note        Chief Complaint: Inpatient follow-up on uncontrolled diabetes mellitus    HPI:   Patient is a 77-year-old white female with IDDM 2 presents to the ER with complaints of persistent hyperglycemia.  She has a history of mild dementia, IDDM 2, hypertension , insomnia and her daughter at bedside assists greatly with the history.  She explains that her PCP is attempting to increase her outpatient regimen of Lantus.  She has been taking 30 units in the morning for quite some time but he recently increased to 60 units in the morning last week.  Despite this change she continues to have readings that are over 400 and often times just read as too high to read.  She does not take short-acting insulin.  On presentation to the ER she was afebrile and hemodynamically stable but her blood glucose was noted to be 700 which did improve with administration of subcutaneous insulin.  Daughter at bedside is concerned because she has to drive to her house every day to administer her morning insulin and she stays there throughout the day to monitor blood sugar.  She also states that the patient does sometimes become lethargic when her blood glucose level drops to the 200s.      Patient is sitting up in bed awake and alert having just eaten lunch.  She is without any acute complaints.  Her daughter and son-in-law are present in the room.  Patient is afebrile, on room air, and hemodynamically stable.  Her capillary blood glucose levels are improved but still suboptimally controlled.  Her daughter showed me paperwork showing that her primary care provider Dr. Anatoly Priest has been trying to get her admitted to [facility] due to failure to thrive.  She has been in and out of the hospital over the past few months with uncontrolled blood glucose levels and her diabetic regimen has been changed multiple times with the last change being an  increase to Lantus 60 units daily.    Interval Hx:   Patient is sitting up in bed about to eat lunch.  She has no acute issues.  Her capillary blood glucose levels remain uncontrolled.  Her family is not present today.  She is afebrile, on room air, and hemodynamically stable.    Objective/physical exam:  General:  Elderly obese white female in no acute distress  HENT: normocephalic, atraumatic  Eye: PERRL, EOMI, clear conjunctiva  Neck: full ROM, no thyromegaly, no JVD  Respiratory: clear to auscultation bilaterally  Cardiovascular: regular rate and rhythm  Gastrointestinal: non-distended, positive bowel sounds, non-tender  Musculoskeletal: no gross deformity  Integumentary: warm, dry, intact, no rashes  Neurological: cranial nerves grossly intact, no focal neurological deficit  Psychiatric: cooperative, poor insight into medical condition      VITAL SIGNS: 24 HRS MIN & MAX LAST   Temp  Min: 97.3 °F (36.3 °C)  Max: 98.4 °F (36.9 °C) 97.9 °F (36.6 °C)   BP  Min: 110/65  Max: 147/70 (!) 147/70   Pulse  Min: 51  Max: 91  (!) 51   Resp  Min: 16  Max: 18 18   SpO2  Min: 94 %  Max: 97 % 97 %       Recent Labs   Lab 06/23/22 2024 06/24/22  0607   WBC 6.5 7.0   RBC 4.77 4.86   HGB 14.0 14.3   HCT 42.5 42.5   MCV 89.1 87.4   MCH 29.4 29.4   MCHC 32.9* 33.6   RDW 13.0 13.1    267   MPV 11.0 10.9       Recent Labs   Lab 06/23/22 2024 06/24/22  0607   * 137   K 4.2 4.1   CO2 25 23   BUN 19.2 14.0   CREATININE 1.29* 0.77   CALCIUM 9.8 9.4   ALBUMIN 3.6 3.4   ALKPHOS 159* 140   ALT 49 44   AST 37* 41*   BILITOT 0.5 0.5          Microbiology Results (last 7 days)     ** No results found for the last 168 hours. **           See below for Radiology    Scheduled Med:   citalopram  40 mg Oral Daily    enoxaparin  40 mg Subcutaneous Daily    insulin detemir U-100  80 Units Subcutaneous BID    pantoprazole  40 mg Oral Daily    pramipexole  0.75 mg Oral TID    traZODone  50 mg Oral QHS        Continuous  Infusions:  None.    PRN Meds:  acetaminophen, dextrose 10 % in water (D10W), dextrose 10 % in water (D10W), dextrose 10%, dextrose 10%, glucagon (human recombinant), glucose, glucose, insulin aspart U-100, melatonin, sodium chloride 0.9%       Assessment/Plan:  Insulin-dependent type 2 diabetes mellitus, uncontrolled  Dementia  Failure to thrive  Pseudohyponatremia, resolved  Obesity      Plan:  Increase Levemir to 80 units twice daily.  I would actually like to place the patient on Humulin 70/30 twice daily but unfortunately it is unavailable at our facility.  If this increase in Levemir does not control her capillary blood glucose levels then she will need scheduled preprandial insulin 3 times daily.  Placed a consultation to Physical therapy and Occupational therapy  Placed a consultation to Case Management for skilled nursing facility placement      Patient condition:  Stable      Anticipated discharge and Disposition:  Skilled nursing facility    All diagnosis and differential diagnosis have been reviewed; assessment and plan has been documented; I have personally reviewed the labs and test results that are presently available; I have reviewed the patients medication list; I have reviewed the consulting providers response and recommendations. I have reviewed or attempted to review medical records based upon their availability    All of the patient's questions have been  addressed and answered. Patient's is agreeable to the above stated plan. I will continue to monitor closely and make adjustments to medical management as needed.  _____________________________________________________________________    Nutrition Status:    Radiology:  X-Ray Chest PA And Lateral     CLINICAL:  Cough.     COMPARISON: August 1, 2021.     FINDINGS:  Cardiopericardial silhouette is within normal limits.  No  acute dense focal or segmental consolidation, congestion, pleural  effusion or pneumothorax.       IMPRESSION:     No acute  cardiopulmonary process identified.       Electronically Signed By: Evan Meza MD  Date/Time Signed: 03/18/2022 10:37      Tye Priest MD   06/26/2022

## 2022-06-27 LAB
POCT GLUCOSE: 111 MG/DL (ref 70–110)
POCT GLUCOSE: 179 MG/DL (ref 70–110)
POCT GLUCOSE: 251 MG/DL (ref 70–110)
POCT GLUCOSE: 255 MG/DL (ref 70–110)
POCT GLUCOSE: 326 MG/DL (ref 70–110)

## 2022-06-27 PROCEDURE — 11000001 HC ACUTE MED/SURG PRIVATE ROOM

## 2022-06-27 PROCEDURE — 63600175 PHARM REV CODE 636 W HCPCS: Performed by: INTERNAL MEDICINE

## 2022-06-27 PROCEDURE — C9399 UNCLASSIFIED DRUGS OR BIOLOG: HCPCS | Performed by: INTERNAL MEDICINE

## 2022-06-27 PROCEDURE — 97166 OT EVAL MOD COMPLEX 45 MIN: CPT

## 2022-06-27 PROCEDURE — 97116 GAIT TRAINING THERAPY: CPT | Mod: CQ

## 2022-06-27 PROCEDURE — 25000003 PHARM REV CODE 250: Performed by: INTERNAL MEDICINE

## 2022-06-27 RX ORDER — INSULIN ASPART 100 [IU]/ML
6 INJECTION, SOLUTION INTRAVENOUS; SUBCUTANEOUS
Status: DISCONTINUED | OUTPATIENT
Start: 2022-06-27 | End: 2022-06-30 | Stop reason: HOSPADM

## 2022-06-27 RX ADMIN — PANTOPRAZOLE SODIUM 40 MG: 40 TABLET, DELAYED RELEASE ORAL at 10:06

## 2022-06-27 RX ADMIN — INSULIN ASPART 6 UNITS: 100 INJECTION, SOLUTION INTRAVENOUS; SUBCUTANEOUS at 05:06

## 2022-06-27 RX ADMIN — TRAZODONE HYDROCHLORIDE 50 MG: 50 TABLET ORAL at 09:06

## 2022-06-27 RX ADMIN — ENOXAPARIN SODIUM 40 MG: 40 INJECTION SUBCUTANEOUS at 05:06

## 2022-06-27 RX ADMIN — INSULIN ASPART 6 UNITS: 100 INJECTION, SOLUTION INTRAVENOUS; SUBCUTANEOUS at 11:06

## 2022-06-27 RX ADMIN — PRAMIPEXOLE DIHYDROCHLORIDE 0.75 MG: 0.25 TABLET ORAL at 10:06

## 2022-06-27 RX ADMIN — CITALOPRAM HYDROBROMIDE 40 MG: 20 TABLET ORAL at 10:06

## 2022-06-27 RX ADMIN — PRAMIPEXOLE DIHYDROCHLORIDE 0.75 MG: 0.25 TABLET ORAL at 03:06

## 2022-06-27 RX ADMIN — PRAMIPEXOLE DIHYDROCHLORIDE 0.75 MG: 0.25 TABLET ORAL at 09:06

## 2022-06-27 RX ADMIN — INSULIN DETEMIR 70 UNITS: 100 INJECTION, SOLUTION SUBCUTANEOUS at 09:06

## 2022-06-27 NOTE — PROGRESS NOTES
[facility] Medicine Progress Note        Chief Complaint: Inpatient follow-up on uncontrolled diabetes mellitus    HPI:   Patient is a 77-year-old white female with IDDM 2 presents to the ER with complaints of persistent hyperglycemia.  She has a history of mild dementia, IDDM 2, hypertension , insomnia and her daughter at bedside assists greatly with the history.  She explains that her PCP is attempting to increase her outpatient regimen of Lantus.  She has been taking 30 units in the morning for quite some time but he recently increased to 60 units in the morning last week.  Despite this change she continues to have readings that are over 400 and often times just read as too high to read.  She does not take short-acting insulin.  On presentation to the ER she was afebrile and hemodynamically stable but her blood glucose was noted to be 700 which did improve with administration of subcutaneous insulin.  Daughter at bedside is concerned because she has to drive to her house every day to administer her morning insulin and she stays there throughout the day to monitor blood sugar.  She also states that the patient does sometimes become lethargic when her blood glucose level drops to the 200s.      Patient is sitting up in bed awake and alert having just eaten lunch.  She is without any acute complaints.  Her daughter and son-in-law are present in the room.  Patient is afebrile, on room air, and hemodynamically stable.  Her capillary blood glucose levels are improved but still suboptimally controlled.  Her daughter showed me paperwork showing that her primary care provider Dr. Anatoly Priest has been trying to get her admitted to [facility] due to failure to thrive.  She has been in and out of the hospital over the past few months with uncontrolled blood glucose levels and her diabetic regimen has been changed multiple times with the last change being an  increase to Lantus 60 units daily.    Interval Hx:   Patient is sitting up in in a chair about to work with physical therapy.  She has no acute issues.  Her capillary blood glucose levels are finally improving.  Her family is not present today.  She is afebrile, on room air, and hemodynamically stable.  Nursing staff reports that they did not administer Levemir 80 units this morning due to relative hypoglycemia.    Objective/physical exam:  General:  Elderly obese white female in no acute distress  HENT: normocephalic, atraumatic  Eye: PERRL, EOMI, clear conjunctiva  Neck: full ROM, no thyromegaly, no JVD  Respiratory: clear to auscultation bilaterally  Cardiovascular: regular rate and rhythm  Gastrointestinal: non-distended, positive bowel sounds, non-tender  Musculoskeletal: no gross deformity  Integumentary: warm, dry, intact, no rashes  Neurological: cranial nerves grossly intact, no focal neurological deficit  Psychiatric: cooperative, poor insight into medical condition      VITAL SIGNS: 24 HRS MIN & MAX LAST   Temp  Min: 97.3 °F (36.3 °C)  Max: 97.8 °F (36.6 °C) 97.3 °F (36.3 °C)   BP  Min: 115/70  Max: 154/82 117/68   Pulse  Min: 70  Max: 96  96   Resp  Min: 16  Max: 18 18   SpO2  Min: 91 %  Max: 98 % 98 %       Recent Labs   Lab 06/23/22 2024 06/24/22  0607   WBC 6.5 7.0   RBC 4.77 4.86   HGB 14.0 14.3   HCT 42.5 42.5   MCV 89.1 87.4   MCH 29.4 29.4   MCHC 32.9* 33.6   RDW 13.0 13.1    267   MPV 11.0 10.9       Recent Labs   Lab 06/23/22 2024 06/24/22  0607   * 137   K 4.2 4.1   CO2 25 23   BUN 19.2 14.0   CREATININE 1.29* 0.77   CALCIUM 9.8 9.4   ALBUMIN 3.6 3.4   ALKPHOS 159* 140   ALT 49 44   AST 37* 41*   BILITOT 0.5 0.5          Microbiology Results (last 7 days)     ** No results found for the last 168 hours. **           See below for Radiology    Scheduled Med:   citalopram  40 mg Oral Daily    enoxaparin  40 mg Subcutaneous Daily    insulin aspart U-100  6 Units Subcutaneous TIDWM     insulin detemir U-100  70 Units Subcutaneous BID    pantoprazole  40 mg Oral Daily    pramipexole  0.75 mg Oral TID    traZODone  50 mg Oral QHS        Continuous Infusions:  None.    PRN Meds:  acetaminophen, dextrose 10 % in water (D10W), dextrose 10 % in water (D10W), dextrose 10%, dextrose 10%, glucagon (human recombinant), glucose, glucose, insulin aspart U-100, melatonin, sodium chloride 0.9%       Assessment/Plan:  Insulin-dependent type 2 diabetes mellitus, uncontrolled  Dementia  Failure to thrive  Pseudohyponatremia, resolved  Obesity      Plan:  Decrease Levemir to 70 units twice daily.  I would actually like to place the patient on Humulin 70/30 twice daily but unfortunately it is unavailable at our facility.  Start scheduled preprandial insulin 3 times daily.  Placed a consultation to Physical therapy and Occupational therapy  Placed a consultation to Case Management for skilled nursing facility placement      Patient condition:  Stable      Anticipated discharge and Disposition:  Skilled nursing facility    All diagnosis and differential diagnosis have been reviewed; assessment and plan has been documented; I have personally reviewed the labs and test results that are presently available; I have reviewed the patients medication list; I have reviewed the consulting providers response and recommendations. I have reviewed or attempted to review medical records based upon their availability    All of the patient's questions have been  addressed and answered. Patient's is agreeable to the above stated plan. I will continue to monitor closely and make adjustments to medical management as needed.  _____________________________________________________________________    Nutrition Status:    Radiology:  X-Ray Chest PA And Lateral     CLINICAL:  Cough.     COMPARISON: August 1, 2021.     FINDINGS:  Cardiopericardial silhouette is within normal limits.  No  acute dense focal or segmental consolidation,  congestion, pleural  effusion or pneumothorax.       IMPRESSION:     No acute cardiopulmonary process identified.       Electronically Signed By: Evan Meza MD  Date/Time Signed: 03/18/2022 10:37      Tye Priest MD   06/27/2022

## 2022-06-27 NOTE — PLAN OF CARE
Problem: Occupational Therapy  Goal: Occupational Therapy Goal  Description: Goals to be met by: 7/25    Patient will increase functional independence with ADLs by performing:    UE Dressing with Modified [place].  LE Dressing with Modified [place].  Grooming while standing with Modified [place].  Toileting from toilet with Modified [place] for hygiene and clothing management.   Toilet transfer to toilet with Modified [place].    Outcome: Ongoing, Progressing

## 2022-06-27 NOTE — PT/OT/SLP PROGRESS
Physical Therapy         Treatment        Jamie Barnes   MRN: [id number]     PT Received On: 06/27/22  PT Start Time: 1126     PT Stop Time: 1143    PT Total Time (min): 17 min       Billable Minutes:  Gait [id number]  Total Minutes: 17    Treatment Type: Treatment  PT/PTA: PTA     PTA Visit Number: 1       General Precautions: Standard,    Orthopedic Precautions:     Braces:           Subjective:  Communicated with nurse prior to session.         Objective:  Patient found sitting up in chair, with Patient found with: telemetry    Balance:   Static Stand: POOR+: Needs MINIMAL assist to maintain  Dynamic stand: POOR+: Needs MIN (minimal ) assist during gait    Transfer Training:  Sit to stand:Minimal Assistance with Rolling Walker x1 trial with cues for safety awareness and UE placement    Gait Training:  Patient gait trained   50  feet on level tile with Rolling Walker with Minimal Assistance.  Pt with demonstarting a  reciprocal gait with decreased trinity, decreased step length and decreased weight-shifting ability.Impairments contributing to gait deviations include impaired balance and decreased strength    Activity Tolerance:  Patient tolerated treatment well    Patient left up in chair with all lines intact and call button in reach.    Assessment:  Jamie Barnes is a 77 y.o. female with a medical diagnosis of Hyperglycemia. She presents with increased endurance.    Rehab potential is good.    Activity tolerance: Good    Discharge recommendations: Discharge Facility/Level of Care Needs: nursing facility, skilled     Equipment recommendations:       GOALS:   Multidisciplinary Problems     Physical Therapy Goals        Problem: Physical Therapy    Goal Priority Disciplines Outcome Goal Variances Interventions   Physical Therapy Goal     PT, PT/OT      Description: LTGs  1. Pt will be ind with bed mobilty  2. Pt will be ind with transfers with rw  3. Pt will be sba with rw 100ft                   PLAN:    Patient  to be seen daily  to address the above listed problems via gait training, therapeutic activities  Plan of Care expires: 06/25/22  Plan of Care reviewed with: patient         6/27/2022

## 2022-06-27 NOTE — PT/OT/SLP EVAL
Occupational Therapy   Evaluation    Name: Jamie Barnes  MRN: [id number]  Admitting Diagnosis:  Hyperglycemia  Recent Surgery: * No surgery found *      Recommendations:     Discharge Recommendations: nursing facility, skilled  Discharge Equipment Recommendations:     Barriers to discharge:       Assessment:     Jamie Barnes is a 77 y.o. female with a medical diagnosis of Hyperglycemia.  She presents with the following performance deficits affecting function: weakness, impaired endurance, impaired self care skills, impaired functional mobilty, gait instability, impaired balance.      Rehab Prognosis: Good; patient would benefit from acute skilled OT services to address these deficits and reach maximum level of function.       Plan:     Patient to be seen 5 x/week to address the above listed problems via self-care/home management, therapeutic activities, therapeutic exercises  · Plan of Care Expires: 07/27/22  · Plan of Care Reviewed with: patient    Subjective     Chief Complaint: --  Patient/Family Comments/goals: --    Occupational Profile:  Living Environment: lives with family   Previous level of function: assist from family with ADLs/IADLs   Roles and Routines: does not drive, unemployed   Equipment Used at Home:  walker, rolling, rollator  Assistance upon Discharge: family but not 24/7     Pain/Comfort:  ·      Patients cultural, spiritual, Anabaptism conflicts given the current situation:      Objective:     Communicated with:  Patient found HOB elevated with   upon OT entry to room.    General Precautions: Standard,     Orthopedic Precautions:    Braces:    Respiratory Status: Room air    Occupational Performance:    Bed Mobility:    · Patient completed Supine to Sit with minimum assistance  · Patient completed Sit to Supine with minimum assistance    Functional Mobility/Transfers:  · Patient completed Bed <> Chair Transfer using Step Transfer technique with minimum assistance with rolling  walker  · Functional Mobility: min A with RW     Activities of Daily Living:  · Grooming: minimum assistance .  · Upper Body Dressing: minimum assistance .  · Lower Body Dressing: minimum assistance .  · Toileting: minimum assistance .    Cognitive/Visual Perceptual:  Cognitive/Psychosocial Skills:     -       Follows Commands/attention:Follows multistep  commands    Physical Exam:  Upper Extremity Strength:    -       Right Upper Extremity: WNL  -       Left Upper Extremity: WNL    AMPAC 6 Click ADL:  AMPAC Total Score: 19    Treatment & Education:    Education:    Patient left up in chair with all lines intact, call button in reach and CNA present    GOALS:   Multidisciplinary Problems     Occupational Therapy Goals        Problem: Occupational Therapy    Goal Priority Disciplines Outcome Interventions   Occupational Therapy Goal     OT, PT/OT Ongoing, Progressing    Description: Goals to be met by: 7/25    Patient will increase functional independence with ADLs by performing:    UE Dressing with Modified [place].  LE Dressing with Modified [place].  Grooming while standing with Modified [place].  Toileting from toilet with Modified [place] for hygiene and clothing management.   Toilet transfer to toilet with Modified [place].                     History:     Past Medical History:   Diagnosis Date    Diabetes mellitus     Hypertension        History reviewed. No pertinent surgical history.    Time Tracking:     OT Date of Treatment:    OT Start Time: 1012  OT Stop Time: 1028  OT Total Time (min): 16 min    Billable Minutes:Evaluation Moderate Complexity 16min    6/27/2022

## 2022-06-27 NOTE — PLAN OF CARE
Spoke to patient and family about skilled nursing facility (SNF).  They have already started the process for placement at [facility]. Spoke to Stephanie at [facility] who confirm. She is currently awaiting 142. Patient is Level II.

## 2022-06-28 LAB
POCT GLUCOSE: 185 MG/DL (ref 70–110)
POCT GLUCOSE: 201 MG/DL (ref 70–110)
POCT GLUCOSE: 231 MG/DL (ref 70–110)
POCT GLUCOSE: 231 MG/DL (ref 70–110)
POCT GLUCOSE: 327 MG/DL (ref 70–110)

## 2022-06-28 PROCEDURE — 63600175 PHARM REV CODE 636 W HCPCS: Performed by: INTERNAL MEDICINE

## 2022-06-28 PROCEDURE — 11000001 HC ACUTE MED/SURG PRIVATE ROOM

## 2022-06-28 PROCEDURE — C9399 UNCLASSIFIED DRUGS OR BIOLOG: HCPCS | Performed by: INTERNAL MEDICINE

## 2022-06-28 PROCEDURE — 97530 THERAPEUTIC ACTIVITIES: CPT | Mod: CQ

## 2022-06-28 PROCEDURE — 25000003 PHARM REV CODE 250: Performed by: INTERNAL MEDICINE

## 2022-06-28 PROCEDURE — 97116 GAIT TRAINING THERAPY: CPT | Mod: CQ

## 2022-06-28 PROCEDURE — 63600175 PHARM REV CODE 636 W HCPCS: Performed by: PHYSICIAN ASSISTANT

## 2022-06-28 RX ADMIN — INSULIN ASPART 6 UNITS: 100 INJECTION, SOLUTION INTRAVENOUS; SUBCUTANEOUS at 07:06

## 2022-06-28 RX ADMIN — INSULIN ASPART 6 UNITS: 100 INJECTION, SOLUTION INTRAVENOUS; SUBCUTANEOUS at 05:06

## 2022-06-28 RX ADMIN — TRAZODONE HYDROCHLORIDE 50 MG: 50 TABLET ORAL at 09:06

## 2022-06-28 RX ADMIN — INSULIN DETEMIR 70 UNITS: 100 INJECTION, SOLUTION SUBCUTANEOUS at 09:06

## 2022-06-28 RX ADMIN — PRAMIPEXOLE DIHYDROCHLORIDE 0.75 MG: 0.25 TABLET ORAL at 09:06

## 2022-06-28 RX ADMIN — PANTOPRAZOLE SODIUM 40 MG: 40 TABLET, DELAYED RELEASE ORAL at 09:06

## 2022-06-28 RX ADMIN — ENOXAPARIN SODIUM 40 MG: 40 INJECTION SUBCUTANEOUS at 05:06

## 2022-06-28 RX ADMIN — CITALOPRAM HYDROBROMIDE 40 MG: 20 TABLET ORAL at 09:06

## 2022-06-28 RX ADMIN — INSULIN ASPART 6 UNITS: 100 INJECTION, SOLUTION INTRAVENOUS; SUBCUTANEOUS at 11:06

## 2022-06-28 RX ADMIN — PRAMIPEXOLE DIHYDROCHLORIDE 0.75 MG: 0.25 TABLET ORAL at 03:06

## 2022-06-28 RX ADMIN — MELATONIN TAB 3 MG 6 MG: 3 TAB at 09:06

## 2022-06-28 RX ADMIN — INSULIN ASPART 8 UNITS: 100 INJECTION, SOLUTION INTRAVENOUS; SUBCUTANEOUS at 11:06

## 2022-06-28 NOTE — PLAN OF CARE
Notified Tia with [facility] of psych consult.   Prolonged QT interval

## 2022-06-29 PROBLEM — Z00.8 ENCOUNTER FOR PSYCHOLOGICAL EVALUATION: Status: ACTIVE | Noted: 2022-06-29

## 2022-06-29 PROBLEM — F32.5 DEPRESSION, MAJOR, IN REMISSION: Status: ACTIVE | Noted: 2022-06-29

## 2022-06-29 LAB
POCT GLUCOSE: 192 MG/DL (ref 70–110)
POCT GLUCOSE: 200 MG/DL (ref 70–110)
POCT GLUCOSE: 251 MG/DL (ref 70–110)
POCT GLUCOSE: 262 MG/DL (ref 70–110)

## 2022-06-29 PROCEDURE — 97535 SELF CARE MNGMENT TRAINING: CPT | Mod: CO

## 2022-06-29 PROCEDURE — 97116 GAIT TRAINING THERAPY: CPT | Mod: CQ

## 2022-06-29 PROCEDURE — 97530 THERAPEUTIC ACTIVITIES: CPT | Mod: CQ

## 2022-06-29 PROCEDURE — 25000003 PHARM REV CODE 250: Performed by: INTERNAL MEDICINE

## 2022-06-29 PROCEDURE — C9399 UNCLASSIFIED DRUGS OR BIOLOG: HCPCS | Performed by: INTERNAL MEDICINE

## 2022-06-29 PROCEDURE — 63600175 PHARM REV CODE 636 W HCPCS: Performed by: INTERNAL MEDICINE

## 2022-06-29 PROCEDURE — 11000001 HC ACUTE MED/SURG PRIVATE ROOM

## 2022-06-29 RX ADMIN — ENOXAPARIN SODIUM 40 MG: 40 INJECTION SUBCUTANEOUS at 04:06

## 2022-06-29 RX ADMIN — PRAMIPEXOLE DIHYDROCHLORIDE 0.75 MG: 0.25 TABLET ORAL at 02:06

## 2022-06-29 RX ADMIN — PRAMIPEXOLE DIHYDROCHLORIDE 0.75 MG: 0.25 TABLET ORAL at 08:06

## 2022-06-29 RX ADMIN — INSULIN ASPART 6 UNITS: 100 INJECTION, SOLUTION INTRAVENOUS; SUBCUTANEOUS at 08:06

## 2022-06-29 RX ADMIN — CITALOPRAM HYDROBROMIDE 40 MG: 20 TABLET ORAL at 08:06

## 2022-06-29 RX ADMIN — PRAMIPEXOLE DIHYDROCHLORIDE 0.75 MG: 0.25 TABLET ORAL at 09:06

## 2022-06-29 RX ADMIN — TRAZODONE HYDROCHLORIDE 50 MG: 50 TABLET ORAL at 09:06

## 2022-06-29 RX ADMIN — INSULIN DETEMIR 70 UNITS: 100 INJECTION, SOLUTION SUBCUTANEOUS at 09:06

## 2022-06-29 RX ADMIN — INSULIN ASPART 6 UNITS: 100 INJECTION, SOLUTION INTRAVENOUS; SUBCUTANEOUS at 11:06

## 2022-06-29 RX ADMIN — INSULIN DETEMIR 70 UNITS: 100 INJECTION, SOLUTION SUBCUTANEOUS at 08:06

## 2022-06-29 RX ADMIN — INSULIN ASPART 6 UNITS: 100 INJECTION, SOLUTION INTRAVENOUS; SUBCUTANEOUS at 04:06

## 2022-06-29 RX ADMIN — PANTOPRAZOLE SODIUM 40 MG: 40 TABLET, DELAYED RELEASE ORAL at 08:06

## 2022-06-29 NOTE — PLAN OF CARE
Received call from [facility] requesting additional information regarding Level II. Requested information faxed [phone].

## 2022-06-29 NOTE — CONSULTS
"[facility] - 4th Floor Medical Telemetry  Psychiatry  Consult Note    Patient Name: Jamie Barnes  MRN: [id number]   Code Status: Full Code  Admission Date: 6/23/2022  Hospital Length of Stay: 5 days  Attending Physician: Koko Mays MD  Primary Care Provider: Anatoly Priest MD    Current Legal Status: Uncontested    Patient information was obtained from patient, ER records and primary team.   Inpatient consult to Psychiatry  Consult performed by: KATHY Stephens  Consult ordered by: Carlos Martinez MD  Assessment/Recommendations: Psychiatry Recommendations:  1.  Ms. Barnes's depression is stable.  2.  She is mentally/emotionally stable at this time.  3.  Continue with citalopram 40 mg PO daily  4.  Continue with trazodone 50 mg PO Q HS  5.  Re-consult psych if needed.        Subjective:     Principal Problem:Hyperglycemia    Chief Complaint:  "I have been doing good."  Psych consult for nursing home placement.     HPI: 77 year old female  female admitted with hyperglycemia.  Ms. Barnes says that having high blood sugars have been a problem for her off and on.      Psych was consulted for level 2 for nursing home placement.  Ms. Barnes says that it is supposed to be a facility in [place].  She says that her daughter has already gone look at it and she will be pleased with going there.  She denies depression or anxiety.  She denies anger and irritability.  She denies an elevated mood.  She denies SI or HI.  She denies AVH and delusional thinking.  She reports a good appetite and states that she has been sleeping well.    Ms. Barnes says that she was diagnosed with depression over twenty years ago and it is treated by her PCP.  She says that she has been on citalopram for years.  She says that her current medication regimen has left her without depression for years.  She reports tolerating this medication well.      Hospital Course: No notes on file         Patient " History               Medical as of 6/29/2022       Past Medical History       Diagnosis Date Comments Source    Depression -- -- Provider    Diabetes mellitus -- -- Provider    Hx of cataract surgery -- -- Provider    Hx of psychiatric care -- -- Provider    Hypertension -- -- Provider    Mixed hyperlipidemia -- -- Provider    Psychiatric problem -- -- Provider              Pertinent Negatives       Diagnosis Date Noted Comments Source    History of psychiatric hospitalization 06/29/2022 -- Provider    Vicki 06/29/2022 -- Provider    Suicide attempt 06/29/2022 -- Provider    Therapy 06/29/2022 -- Provider                          Surgical as of 6/29/2022    Past Surgical History: Patient provided no pertinent surgical history.               Family as of 6/29/2022    None               Tobacco Use as of 6/29/2022       Smoking Status Smoking Start Date Smoking Quit Date Packs/Day Years Used    Former Smoker -- -- -- --      Types Comments Smokeless Tobacco Status Smokeless Tobacco Quit Date Source    -- -- Never Used -- Provider                  Alcohol Use as of 6/29/2022       Alcohol Use Drinks/Week Alcohol/Week Comments Source    Not Currently   -- Last drink years ago and it was occasional use. Provider                  Drug Use as of 6/29/2022       Drug Use Types Frequency Comments Source    Never -- -- -- Provider                  Sexual Activity as of 6/29/2022       Sexually Active Birth Control Partners Comments Source    -- -- -- -- Provider                  Activities of Daily Living as of 6/29/2022    None               Social Documentation as of 6/29/2022    None               Occupational as of 6/29/2022    None               Socioeconomic as of 6/29/2022       Marital Status Spouse Name Number of Children Years Education Education Level Preferred Language Ethnicity Race Source     -- 2 -- -- English Not  or /a White Provider                  Pertinent History       Question  Response Comments    Lives with alone has a sitter for part of the day.    Place in Birth Order other --    Lives in home --    Number of Siblings other 6 siblings; only one left living    Raised by -- --    Legal Involvement none --    Childhood Trauma -- --    Criminal History of none --    Financial Status -- --    Highest Level of Education high school graduation --    Does patient have access to a firearm? No --     Service -- --    Primary Leisure Activity -- --    Spirituality -- --          Past Medical History:   Diagnosis Date    Depression     Diabetes mellitus     Hx of cataract surgery     Hx of psychiatric care     Hypertension     Mixed hyperlipidemia     Psychiatric problem      History reviewed. No pertinent surgical history.  Family History    None       Tobacco Use    Smoking status: Former Smoker    Smokeless tobacco: Never Used   Substance and Sexual Activity    Alcohol use: Not Currently     Comment: Last drink years ago and it was occasional use.    Drug use: Never    Sexual activity: Not on file     Review of patient's allergies indicates:   Allergen Reactions    Hydrocodone-acetaminophen      Other reaction(s): itch, rash    Hydromorphone     Penicillins Itching     Other reaction(s): itch, rash    Tetracycline      Other reaction(s): itch,rash    Levofloxacin Rash       No current facility-administered medications on file prior to encounter.     Current Outpatient Medications on File Prior to Encounter   Medication Sig    citalopram (CELEXA) 40 MG tablet Take 40 mg by mouth once daily.    hydrOXYzine HCL (ATARAX) 25 MG tablet Take 25 mg by mouth 3 (three) times daily as needed for Itching.    melatonin 10 mg Tab Take by mouth.    multivitamin capsule Take 1 capsule by mouth once daily.    pantoprazole (PROTONIX) 40 MG tablet Take 40 mg by mouth once daily.    pramipexole (MIRAPEX) 0.75 MG tablet Take 0.75 mg by mouth 3 (three) times daily.    traZODone  "(DESYREL) 50 MG tablet Take 50 mg by mouth every evening.     Psychotherapeutics (From admission, onward)                Start     Stop Route Frequency Ordered    06/24/22 2100  traZODone tablet 50 mg         -- Oral Nightly 06/24/22 1300    06/24/22 1400  citalopram tablet 40 mg         -- Oral Daily 06/24/22 1300          Review of Systems   Constitutional: Negative.    HENT: Negative.     Eyes: Negative.    Respiratory: Negative.     Cardiovascular: Negative.    Gastrointestinal: Negative.    Endocrine: Positive for polydipsia, polyphagia and polyuria.   Genitourinary: Negative.    Musculoskeletal: Negative.    Allergic/Immunologic: Negative.    Neurological: Negative.    Psychiatric/Behavioral: Negative.  Negative for suicidal ideas.    Strengths and Liabilities: Strength: Patient accepts guidance/feedback, Strength: Patient is expressive/articulate.    Objective:     Vital Signs (Most Recent):  Temp: 98 °F (36.7 °C) (06/29/22 0709)  Pulse: 80 (06/29/22 0709)  Resp: 18 (06/29/22 0709)  BP: (!) 150/86 (06/29/22 0709)  SpO2: (!) 92 % (06/29/22 0709)   Vital Signs (24h Range):  Temp:  [97.6 °F (36.4 °C)-98.3 °F (36.8 °C)] 98 °F (36.7 °C)  Pulse:  [79-89] 80  Resp:  [18-20] 18  SpO2:  [92 %-97 %] 92 %  BP: (114-150)/(65-86) 150/86     Height: 5' 4.02" (162.6 cm)  Weight: 72.6 kg (160 lb)  Body mass index is 27.45 kg/m².      Intake/Output Summary (Last 24 hours) at 6/29/2022 0850  Last data filed at 6/29/2022 0500  Gross per 24 hour   Intake 1430 ml   Output 450 ml   Net 980 ml       Physical Exam  Vitals and nursing note reviewed.   Neurological:      Mental Status: She is alert.   Psychiatric:         Attention and Perception: Attention and perception normal.         Mood and Affect: Mood and affect normal.         Speech: Speech normal.         Behavior: Behavior normal. Behavior is cooperative.         Thought Content: Thought content normal. Thought content does not include homicidal or suicidal ideation.       "   Cognition and Memory: Cognition normal. Memory is impaired.         Judgment: Judgment normal.      Comments: Very plesant     NEUROLOGICAL EXAMINATION:     MENTAL STATUS   Oriented to person.   Oriented to place.   Disoriented to time.   Registration: recalls 3 of 3 objects. Recall at 5 minutes: recalls 2 of 3 objects.   Speech: speech is normal   Level of consciousness: alert  Knowledge: consistent with education. Able to perform simple calculations.   Able to name object. Normal comprehension.   Significant Labs: Last 72 Hours:   Recent Lab Results  (Last 5 results in the past 72 hours)        06/28/22  2130   06/28/22  1551   06/28/22  1043   06/28/22  0819   06/28/22  0616        POCT Glucose 231   231   327   185   201                              Significant Imaging: I have reviewed all pertinent imaging results/findings within the past 24 hours.    Assessment/Plan:     Encounter for psychological evaluation  Psychiatry Recommendations:  1.  Ms. Barnes's depression is stable.  2.  She is mentally/emotionally stable at this time.  3.  Continue with citalopram 40 mg PO daily  4.  Continue with trazodone 50 mg PO Q HS  5.  Re-consult psych if needed.         Total Time:  45 minutes     HARVEY Stephens-JUAN   Psychiatry  [facility] - 4th Floor Medical Telemetry

## 2022-06-29 NOTE — SUBJECTIVE & OBJECTIVE
Patient History               Medical as of 6/29/2022       Past Medical History       Diagnosis Date Comments Source    Depression -- -- Provider    Diabetes mellitus -- -- Provider    Hx of cataract surgery -- -- Provider    Hx of psychiatric care -- -- Provider    Hypertension -- -- Provider    Mixed hyperlipidemia -- -- Provider    Psychiatric problem -- -- Provider              Pertinent Negatives       Diagnosis Date Noted Comments Source    History of psychiatric hospitalization 06/29/2022 -- Provider    Vicki 06/29/2022 -- Provider    Suicide attempt 06/29/2022 -- Provider    Therapy 06/29/2022 -- Provider                          Surgical as of 6/29/2022    Past Surgical History: Patient provided no pertinent surgical history.               Family as of 6/29/2022    None               Tobacco Use as of 6/29/2022       Smoking Status Smoking Start Date Smoking Quit Date Packs/Day Years Used    Former Smoker -- -- -- --      Types Comments Smokeless Tobacco Status Smokeless Tobacco Quit Date Source    -- -- Never Used -- Provider                  Alcohol Use as of 6/29/2022       Alcohol Use Drinks/Week Alcohol/Week Comments Source    Not Currently   -- Last drink years ago and it was occasional use. Provider                  Drug Use as of 6/29/2022       Drug Use Types Frequency Comments Source    Never -- -- -- Provider                  Sexual Activity as of 6/29/2022       Sexually Active Birth Control Partners Comments Source    -- -- -- -- Provider                  Activities of Daily Living as of 6/29/2022    None               Social Documentation as of 6/29/2022    None               Occupational as of 6/29/2022    None               Socioeconomic as of 6/29/2022       Marital Status Spouse Name Number of Children Years Education Education Level Preferred Language Ethnicity Race Source     -- 2 -- -- English Not  or /a White Provider                  Pertinent History        Question Response Comments    Lives with alone has a sitter for part of the day.    Place in Birth Order other --    Lives in home --    Number of Siblings other 6 siblings; only one left living    Raised by -- --    Legal Involvement none --    Childhood Trauma -- --    Criminal History of none --    Financial Status -- --    Highest Level of Education high school graduation --    Does patient have access to a firearm? No --     Service -- --    Primary Leisure Activity -- --    Spirituality -- --          Past Medical History:   Diagnosis Date    Depression     Diabetes mellitus     Hx of cataract surgery     Hx of psychiatric care     Hypertension     Mixed hyperlipidemia     Psychiatric problem      History reviewed. No pertinent surgical history.  Family History    None       Tobacco Use    Smoking status: Former Smoker    Smokeless tobacco: Never Used   Substance and Sexual Activity    Alcohol use: Not Currently     Comment: Last drink years ago and it was occasional use.    Drug use: Never    Sexual activity: Not on file     Review of patient's allergies indicates:   Allergen Reactions    Hydrocodone-acetaminophen      Other reaction(s): itch, rash    Hydromorphone     Penicillins Itching     Other reaction(s): itch, rash    Tetracycline      Other reaction(s): itch,rash    Levofloxacin Rash       No current facility-administered medications on file prior to encounter.     Current Outpatient Medications on File Prior to Encounter   Medication Sig    citalopram (CELEXA) 40 MG tablet Take 40 mg by mouth once daily.    hydrOXYzine HCL (ATARAX) 25 MG tablet Take 25 mg by mouth 3 (three) times daily as needed for Itching.    melatonin 10 mg Tab Take by mouth.    multivitamin capsule Take 1 capsule by mouth once daily.    pantoprazole (PROTONIX) 40 MG tablet Take 40 mg by mouth once daily.    pramipexole (MIRAPEX) 0.75 MG tablet Take 0.75 mg by mouth 3 (three) times daily.    traZODone (DESYREL) 50 MG  "tablet Take 50 mg by mouth every evening.     Psychotherapeutics (From admission, onward)                Start     Stop Route Frequency Ordered    06/24/22 2100  traZODone tablet 50 mg         -- Oral Nightly 06/24/22 1300    06/24/22 1400  citalopram tablet 40 mg         -- Oral Daily 06/24/22 1300          Review of Systems   Constitutional: Negative.    HENT: Negative.     Eyes: Negative.    Respiratory: Negative.     Cardiovascular: Negative.    Gastrointestinal: Negative.    Endocrine: Positive for polydipsia, polyphagia and polyuria.   Genitourinary: Negative.    Musculoskeletal: Negative.    Allergic/Immunologic: Negative.    Neurological: Negative.    Psychiatric/Behavioral: Negative.  Negative for suicidal ideas.    Strengths and Liabilities: Strength: Patient accepts guidance/feedback, Strength: Patient is expressive/articulate.    Objective:     Vital Signs (Most Recent):  Temp: 98 °F (36.7 °C) (06/29/22 0709)  Pulse: 80 (06/29/22 0709)  Resp: 18 (06/29/22 0709)  BP: (!) 150/86 (06/29/22 0709)  SpO2: (!) 92 % (06/29/22 0709)   Vital Signs (24h Range):  Temp:  [97.6 °F (36.4 °C)-98.3 °F (36.8 °C)] 98 °F (36.7 °C)  Pulse:  [79-89] 80  Resp:  [18-20] 18  SpO2:  [92 %-97 %] 92 %  BP: (114-150)/(65-86) 150/86     Height: 5' 4.02" (162.6 cm)  Weight: 72.6 kg (160 lb)  Body mass index is 27.45 kg/m².      Intake/Output Summary (Last 24 hours) at 6/29/2022 0850  Last data filed at 6/29/2022 0500  Gross per 24 hour   Intake 1430 ml   Output 450 ml   Net 980 ml       Physical Exam  Vitals and nursing note reviewed.   Neurological:      Mental Status: She is alert.   Psychiatric:         Attention and Perception: Attention and perception normal.         Mood and Affect: Mood and affect normal.         Speech: Speech normal.         Behavior: Behavior normal. Behavior is cooperative.         Thought Content: Thought content normal. Thought content does not include homicidal or suicidal ideation.         Cognition and " Memory: Cognition normal. Memory is impaired.         Judgment: Judgment normal.      Comments: Very plesant     NEUROLOGICAL EXAMINATION:     MENTAL STATUS   Oriented to person.   Oriented to place.   Disoriented to time.   Registration: recalls 3 of 3 objects. Recall at 5 minutes: recalls 2 of 3 objects.   Speech: speech is normal   Level of consciousness: alert  Knowledge: consistent with education. Able to perform simple calculations.   Able to name object. Normal comprehension.   Significant Labs: Last 72 Hours:   Recent Lab Results  (Last 5 results in the past 72 hours)        06/28/22  2130   06/28/22  1551   06/28/22  1043   06/28/22  0819   06/28/22  0616        POCT Glucose 231   231   327   185   201                              Significant Imaging: I have reviewed all pertinent imaging results/findings within the past 24 hours.

## 2022-06-29 NOTE — PT/OT/SLP PROGRESS
Physical Therapy         Treatment        Jamie Barnes   MRN: [id number]     PT Received On: 06/29/22  PT Start Time: 0915     PT Stop Time: 0938    PT Total Time (min): 23 min       Billable Minutes:  Gait [id number] and Therapeutic Activity 13  Total Minutes: 23    Treatment Type: Treatment  PT/PTA: PTA     PTA Visit Number: 3       General Precautions: Standard,    Orthopedic Precautions:     Braces:           Subjective:  Communicated with nurse prior to session.         Objective:  Patient found supine in bed, with Patient found with: Freeman, telemetry    Functional Mobility:  Bed Mobility:   Supine to sit: Minimal Assistance   Sit to supine: Minimal Assistance   Rolling: Minimal Assistance   Scooting: Minimal Assistance    Balance:   Static Sit: FAIR: Maintains without assist, but unable to take any challenges   Dynamic Sit:  FAIR: Cannot move trunk without losing balance  Static Stand: FAIR: Maintains without assist but unable to take challenges  Dynamic stand: POOR+: Needs MIN (minimal ) assist during gait    Transfer Training:  Sit to stand:Minimal Assistance with Rolling Walker x2 trials    Gait Training:  Patient gait trained   25  Feet x2 trials on level tile with Rolling Walker with Minimal Assistance.  Pt with demonstarting a  reciprocal gait with decreased trinity and decreased step length.Impairments contributing to gait deviations include impaired balance and decreased strength      Activity Tolerance:  Patient tolerated treatment well    Patient left HOB elevated with all lines intact and call button in reach.    Assessment:  Jamie Barnes is a 77 y.o. female with a medical diagnosis of Hyperglycemia. She presents with increased standing balance.    Rehab potential is good.    Activity tolerance: Good    Discharge recommendations: Discharge Facility/Level of Care Needs: nursing facility, skilled     Equipment recommendations: Equipment Needed After Discharge: walker, rolling     GOALS:    Multidisciplinary Problems     Physical Therapy Goals        Problem: Physical Therapy    Goal Priority Disciplines Outcome Goal Variances Interventions   Physical Therapy Goal     PT, PT/OT      Description: LTGs  1. Pt will be ind with bed mobilty  2. Pt will be ind with transfers with rw  3. Pt will be sba with rw 100ft                   PLAN:    Patient to be seen daily  to address the above listed problems via gait training, therapeutic activities  Plan of Care expires: 06/25/22  Plan of Care reviewed with: patient         6/29/2022

## 2022-06-29 NOTE — PROGRESS NOTES
[facility] Medicine Progress Note      Chief Complaint: Inpatient follow-up on uncontrolled diabetes mellitus     HPI:   Patient is a 77-year-old white female with IDDM 2 presents to the ER with complaints of persistent hyperglycemia.  She has a history of mild dementia, IDDM 2, hypertension , insomnia and her daughter at bedside assists greatly with the history.  She explains that her PCP is attempting to increase her outpatient regimen of Lantus.  She has been taking 30 units in the morning for quite some time but he recently increased to 60 units in the morning last week.  Despite this change she continues to have readings that are over 400 and often times just read as too high to read.  She does not take short-acting insulin.  On presentation to the ER she was afebrile and hemodynamically stable but her blood glucose was noted to be 700 which did improve with administration of subcutaneous insulin.  Daughter at bedside is concerned because she has to drive to her house every day to administer her morning insulin and she stays there throughout the day to monitor blood sugar.  She also states that the patient does sometimes become lethargic when her blood glucose level drops to the 200s.        Patient is sitting up in bed awake and alert having just eaten lunch.  She is without any acute complaints.  Her daughter and son-in-law are present in the room.  Patient is afebrile, on room air, and hemodynamically stable.  Her capillary blood glucose levels are improved but still suboptimally controlled.  Her daughter showed me paperwork showing that her primary care provider Dr. Anatoly Priest has been trying to get her admitted to [facility] due to failure to thrive.  She has been in and out of the hospital over the past few months with uncontrolled blood glucose levels and her diabetic regimen has been changed multiple times with the last change being an  increase to Lantus 60 units daily.     Patient currently being managed for uncontrolled blood glucose in a diabetic mellitus patient        Interval Hx:   Patient seen and examined at bedside  She has no complaints today  Glucose control is improving       Objective/physical exam:  General:  Elderly obese white female in no acute distress  HENT: normocephalic, atraumatic  Eye: PERRL, EOMI, clear conjunctiva  Neck: full ROM, no thyromegaly, no JVD  Respiratory: clear to auscultation bilaterally  Cardiovascular: regular rate and rhythm  Gastrointestinal: non-distended, positive bowel sounds, non-tender  Musculoskeletal: no gross deformity  Integumentary: warm, dry, intact, no rashes  Neurological: cranial nerves grossly intact, no focal neurological deficit  Psychiatric: cooperative, poor insight into medical condition     Assessment/Plan:  Insulin-dependent type 2 diabetes mellitus, uncontrolled  Dementia  Failure to thrive  Pseudohyponatremia, resolved  Obesity     Plan:  Glucose control is improving  Decrease Levemir to 70 units twice daily  Start scheduled preprandial insulin 3 times daily.  Placed a consultation to Physical therapy and Occupational therapy  Placed a consultation to Case Management for skilled nursing facility placement     Patient condition:  Stable        Anticipated discharge and Disposition:  Skilled nursing facility     All diagnosis and differential diagnosis have been reviewed; assessment and plan has been documented; I have personally reviewed the labs and test results that are presently available; I have reviewed the patients medication list; I have reviewed the consulting providers response and recommendations. I have reviewed or attempted to review medical records based upon their availability     All of the patient's questions have been  addressed and answered. Patient's is agreeable to the above stated plan. I will continue to monitor closely and make adjustments to medical management  as needed.    VITAL SIGNS: 24 HRS MIN & MAX LAST   Temp  Min: 97.8 °F (36.6 °C)  Max: 98.3 °F (36.8 °C) 97.9 °F (36.6 °C)   BP  Min: 93/59  Max: 150/86 (!) 93/59   Pulse  Min: 79  Max: 84  82   Resp  Min: 18  Max: 20 18   SpO2  Min: 92 %  Max: 95 % (!) 94 %       Recent Labs   Lab 06/23/22 2024 06/24/22  0607   WBC 6.5 7.0   RBC 4.77 4.86   HGB 14.0 14.3   HCT 42.5 42.5   MCV 89.1 87.4   MCH 29.4 29.4   MCHC 32.9* 33.6   RDW 13.0 13.1    267   MPV 11.0 10.9       Recent Labs   Lab 06/23/22 2024 06/24/22  0607   * 137   K 4.2 4.1   CO2 25 23   BUN 19.2 14.0   CREATININE 1.29* 0.77   CALCIUM 9.8 9.4   ALBUMIN 3.6 3.4   ALKPHOS 159* 140   ALT 49 44   AST 37* 41*   BILITOT 0.5 0.5          Microbiology Results (last 7 days)     ** No results found for the last 168 hours. **           See below for Radiology    Scheduled Med:   citalopram  40 mg Oral Daily    enoxaparin  40 mg Subcutaneous Daily    insulin aspart U-100  6 Units Subcutaneous TIDWM    insulin detemir U-100  70 Units Subcutaneous BID    pantoprazole  40 mg Oral Daily    pramipexole  0.75 mg Oral TID    traZODone  50 mg Oral QHS        Continuous Infusions:       PRN Meds:  acetaminophen, dextrose 10 % in water (D10W), dextrose 10 % in water (D10W), dextrose 10%, dextrose 10%, glucagon (human recombinant), glucose, glucose, insulin aspart U-100, melatonin, sodium chloride 0.9%         VTE prophylaxis:     Patient condition:  Stable/Fair/Guarded/ Serious/ Critical    Anticipated discharge and Disposition:         All diagnosis and differential diagnosis have been reviewed; assessment and plan has been documented; I have personally reviewed the labs and test results that are presently available; I have reviewed the patients medication list; I have reviewed the consulting providers response and recommendations. I have reviewed or attempted to review medical records based upon their availability    All of the patient's questions have been   addressed and answered. Patient's is agreeable to the above stated plan. I will continue to monitor closely and make adjustments to medical management as needed.  _____________________________________________________________________    Nutrition Status:    Radiology:  X-Ray Chest PA And Lateral     CLINICAL:  Cough.     COMPARISON: August 1, 2021.     FINDINGS:  Cardiopericardial silhouette is within normal limits.  No  acute dense focal or segmental consolidation, congestion, pleural  effusion or pneumothorax.       IMPRESSION:     No acute cardiopulmonary process identified.       Electronically Signed By: Evan Meza MD  Date/Time Signed: 03/18/2022 10:37      Carlos Martinez MD   06/29/2022

## 2022-06-29 NOTE — PT/OT/SLP PROGRESS
Occupational Therapy  Treatment    Jamie Barnes   MRN: [id number]   Admitting Diagnosis: Hyperglycemia    OT Date of Treatment: 06/29/22   OT Start Time: 1007  OT Stop Time: 1033  OT Total Time (min): 26 min     Billable Minutes:  Self Care/Home Management 26  Total Minutes: 26     OT/MAINOR: MAINOR     MAINOR Visit Number: 1    General Precautions: Standard,    Orthopedic Precautions: N/A  Braces: N/A    Spiritual, Cultural Beliefs, Christian Practices, Values that Affect Care: no    Subjective:  Communicated with nurse prior to session.    Objective:  Patient found with: telemetryFreeman    Functional Mobility:  Bed Mobility:   Supine to sit: Minimal Assistance   Sit to supine: Standby Assistance   Rolling: Standby Assistance   Scooting: Contact Guard Assistance    LE Dressing:  Patient don/doffed socks with Minimal Assistance with sock aide, dressing stick and reacher sitting on edge of bed, with instruction    Balance:   Static Sit: GOOD: Takes MODERATE challenges from all directions  Dynamic Sit:  GOOD: Maintains balance through MODERATE excursions of active trunk movement    Patient left HOB elevated with call button in reach    ASSESSMENT:  Jamie Barnes is a 77 y.o. female with a medical diagnosis of Hyperglycemia and presents with decreased Ax tolerance, decreased strength and decreased ADL skills.    Rehab potential is good    Activity tolerance: Good    Discharge recommendations: nursing facility, skilled     Equipment recommendations: walker, rolling     GOALS:   Multidisciplinary Problems     Occupational Therapy Goals        Problem: Occupational Therapy    Goal Priority Disciplines Outcome Interventions   Occupational Therapy Goal     OT, PT/OT Ongoing, Progressing    Description: Goals to be met by: 7/25    Patient will increase functional independence with ADLs by performing:    UE Dressing with Modified [place].  LE Dressing with Modified [place].  Grooming while standing with Modified  [place].  Toileting from toilet with Modified [place] for hygiene and clothing management.   Toilet transfer to toilet with Modified [place].                     Plan:  Patient to be seen 5 x/week to address the above listed problems via self-care/home management, therapeutic activities, therapeutic exercises  Plan of Care expires: 07/27/22  Plan of Care reviewed with: patient         06/29/2022

## 2022-06-29 NOTE — HPI
77 year old female  female admitted with hyperglycemia.  Ms. Barnes says that having high blood sugars have been a problem for her off and on.      Psych was consulted for level 2 for nursing home placement.  Ms. Barnes says that it is supposed to be a facility in [place].  She says that her daughter has already gone look at it and she will be pleased with going there.  She denies depression or anxiety.  She denies anger and irritability.  She denies an elevated mood.  She denies SI or HI.  She denies AVH and delusional thinking.  She reports a good appetite and states that she has been sleeping well.    Ms. Barnes says that she was diagnosed with depression over twenty years ago and it is treated by her PCP.  She says that she has been on citalopram for years.  She says that her current medication regimen has left her without depression for years.  She reports tolerating this medication well.

## 2022-06-30 VITALS
TEMPERATURE: 97 F | SYSTOLIC BLOOD PRESSURE: 137 MMHG | BODY MASS INDEX: 27.31 KG/M2 | OXYGEN SATURATION: 94 % | RESPIRATION RATE: 18 BRPM | WEIGHT: 160 LBS | HEIGHT: 64 IN | HEART RATE: 82 BPM | DIASTOLIC BLOOD PRESSURE: 76 MMHG

## 2022-06-30 LAB
POCT GLUCOSE: 118 MG/DL (ref 70–110)
POCT GLUCOSE: 237 MG/DL (ref 70–110)
POCT GLUCOSE: 316 MG/DL (ref 70–110)
SARS-COV-2 RDRP RESP QL NAA+PROBE: NEGATIVE

## 2022-06-30 PROCEDURE — 87635 SARS-COV-2 COVID-19 AMP PRB: CPT | Performed by: INTERNAL MEDICINE

## 2022-06-30 PROCEDURE — C9399 UNCLASSIFIED DRUGS OR BIOLOG: HCPCS | Performed by: INTERNAL MEDICINE

## 2022-06-30 PROCEDURE — 25000003 PHARM REV CODE 250: Performed by: INTERNAL MEDICINE

## 2022-06-30 PROCEDURE — 63600175 PHARM REV CODE 636 W HCPCS: Performed by: INTERNAL MEDICINE

## 2022-06-30 RX ORDER — INSULIN ASPART 100 [IU]/ML
6 INJECTION, SOLUTION INTRAVENOUS; SUBCUTANEOUS 3 TIMES DAILY
Qty: 5.4 ML | Refills: 11 | Status: SHIPPED | OUTPATIENT
Start: 2022-06-30 | End: 2023-06-30

## 2022-06-30 RX ADMIN — PANTOPRAZOLE SODIUM 40 MG: 40 TABLET, DELAYED RELEASE ORAL at 08:06

## 2022-06-30 RX ADMIN — ENOXAPARIN SODIUM 40 MG: 40 INJECTION SUBCUTANEOUS at 04:06

## 2022-06-30 RX ADMIN — INSULIN ASPART 6 UNITS: 100 INJECTION, SOLUTION INTRAVENOUS; SUBCUTANEOUS at 04:06

## 2022-06-30 RX ADMIN — PRAMIPEXOLE DIHYDROCHLORIDE 0.75 MG: 0.25 TABLET ORAL at 03:06

## 2022-06-30 RX ADMIN — CITALOPRAM HYDROBROMIDE 40 MG: 20 TABLET ORAL at 08:06

## 2022-06-30 RX ADMIN — INSULIN ASPART 6 UNITS: 100 INJECTION, SOLUTION INTRAVENOUS; SUBCUTANEOUS at 11:06

## 2022-06-30 RX ADMIN — PRAMIPEXOLE DIHYDROCHLORIDE 0.75 MG: 0.25 TABLET ORAL at 08:06

## 2022-06-30 RX ADMIN — INSULIN DETEMIR 70 UNITS: 100 INJECTION, SOLUTION SUBCUTANEOUS at 08:06

## 2022-06-30 NOTE — PROGRESS NOTES
[facility] Medicine Progress Note        Chief Complaint: Inpatient follow-up on uncontrolled diabetes mellitus     HPI:   Patient is a 77-year-old white female with IDDM 2 presents to the ER with complaints of persistent hyperglycemia.  She has a history of mild dementia, IDDM 2, hypertension , insomnia and her daughter at bedside assists greatly with the history.  She explains that her PCP is attempting to increase her outpatient regimen of Lantus.  She has been taking 30 units in the morning for quite some time but he recently increased to 60 units in the morning last week.  Despite this change she continues to have readings that are over 400 and often times just read as too high to read.  She does not take short-acting insulin.  On presentation to the ER she was afebrile and hemodynamically stable but her blood glucose was noted to be 700 which did improve with administration of subcutaneous insulin.  Daughter at bedside is concerned because she has to drive to her house every day to administer her morning insulin and she stays there throughout the day to monitor blood sugar.  She also states that the patient does sometimes become lethargic when her blood glucose level drops to the 200s.        Patient is sitting up in bed awake and alert having just eaten lunch.  She is without any acute complaints.  Her daughter and son-in-law are present in the room.  Patient is afebrile, on room air, and hemodynamically stable.  Her capillary blood glucose levels are improved but still suboptimally controlled.  Her daughter showed me paperwork showing that her primary care provider Dr. Anatoly Priest has been trying to get her admitted to [facility] due to failure to thrive.  She has been in and out of the hospital over the past few months with uncontrolled blood glucose levels and her diabetic regimen has been changed multiple times with the last change being an  increase to Lantus 60 units daily.     Patient currently being managed for uncontrolled blood glucose in a diabetic mellitus patient        Interval Hx:   Patient seen and examined at bedside  No overnight events         Objective/physical exam:  General:  Elderly obese white female in no acute distress  HENT: normocephalic, atraumatic  Eye: PERRL, EOMI, clear conjunctiva  Neck: full ROM, no thyromegaly, no JVD  Respiratory: clear to auscultation bilaterally  Cardiovascular: regular rate and rhythm  Gastrointestinal: non-distended, positive bowel sounds, non-tender  Musculoskeletal: no gross deformity  Integumentary: warm, dry, intact, no rashes  Neurological: cranial nerves grossly intact, no focal neurological deficit  Psychiatric: cooperative, poor insight into medical condition     Assessment/Plan:  Insulin-dependent type 2 diabetes mellitus, uncontrolled  Dementia  Failure to thrive  Pseudohyponatremia, resolved  Obesity     Plan:  Glucose control is improving  Decrease Levemir to 70 units twice daily  Start scheduled preprandial insulin 3 times daily.  Placed a consultation to Physical therapy and Occupational therapy  Placed a consultation to Case Management for skilled nursing facility placement     Patient condition:  Stable        Anticipated discharge and Disposition:  Skilled nursing facility     All diagnosis and differential diagnosis have been reviewed; assessment and plan has been documented; I have personally reviewed the labs and test results that are presently available; I have reviewed the patients medication list; I have reviewed the consulting providers response and recommendations. I have reviewed or attempted to review medical records based upon their availability     All of the patient's questions have been  addressed and answered. Patient's is agreeable to the above stated plan. I will continue to monitor closely and make adjustments to medical management as needed.    VITAL SIGNS: 24 HRS  MIN & MAX LAST   Temp  Min: 97.2 °F (36.2 °C)  Max: 98.6 °F (37 °C) 97.2 °F (36.2 °C)   BP  Min: 109/51  Max: 149/78 125/85   Pulse  Min: 74  Max: 85  85   Resp  Min: 18  Max: 18 18   SpO2  Min: 93 %  Max: 96 % 96 %       Recent Labs   Lab 06/23/22 2024 06/24/22  0607   WBC 6.5 7.0   RBC 4.77 4.86   HGB 14.0 14.3   HCT 42.5 42.5   MCV 89.1 87.4   MCH 29.4 29.4   MCHC 32.9* 33.6   RDW 13.0 13.1    267   MPV 11.0 10.9       Recent Labs   Lab 06/23/22 2024 06/24/22  0607   * 137   K 4.2 4.1   CO2 25 23   BUN 19.2 14.0   CREATININE 1.29* 0.77   CALCIUM 9.8 9.4   ALBUMIN 3.6 3.4   ALKPHOS 159* 140   ALT 49 44   AST 37* 41*   BILITOT 0.5 0.5          Microbiology Results (last 7 days)     ** No results found for the last 168 hours. **           See below for Radiology    Scheduled Med:   citalopram  40 mg Oral Daily    enoxaparin  40 mg Subcutaneous Daily    insulin aspart U-100  6 Units Subcutaneous TIDWM    insulin detemir U-100  70 Units Subcutaneous BID    pantoprazole  40 mg Oral Daily    pramipexole  0.75 mg Oral TID    traZODone  50 mg Oral QHS        Continuous Infusions:       PRN Meds:  acetaminophen, dextrose 10 % in water (D10W), dextrose 10 % in water (D10W), dextrose 10%, dextrose 10%, glucagon (human recombinant), glucose, glucose, insulin aspart U-100, melatonin, sodium chloride 0.9%       VTE prophylaxis:     Patient condition:  Stable/Fair/Guarded/ Serious/ Critical    Anticipated discharge and Disposition:         All diagnosis and differential diagnosis have been reviewed; assessment and plan has been documented; I have personally reviewed the labs and test results that are presently available; I have reviewed the patients medication list; I have reviewed the consulting providers response and recommendations. I have reviewed or attempted to review medical records based upon their availability    All of the patient's questions have been  addressed and answered. Patient's is  agreeable to the above stated plan. I will continue to monitor closely and make adjustments to medical management as needed.  _____________________________________________________________________    Nutrition Status:    Radiology:  X-Ray Chest PA And Lateral     CLINICAL:  Cough.     COMPARISON: August 1, 2021.     FINDINGS:  Cardiopericardial silhouette is within normal limits.  No  acute dense focal or segmental consolidation, congestion, pleural  effusion or pneumothorax.       IMPRESSION:     No acute cardiopulmonary process identified.       Electronically Signed By: Evan Meza MD  Date/Time Signed: 03/18/2022 10:37      Carlos Martinez MD   06/30/2022

## 2022-06-30 NOTE — DISCHARGE SUMMARY
[facility] Medicine Discharge Summary    Admit Date: 6/23/2022  Discharge Date and Time: 6/30/20221:37 PM  Admitting Physician: vandana park   Discharging Physician: Carlos Martinez MD.  Primary Care Physician: Anatoly Priest MD  Consults: none     Discharge Diagnoses:  Insulin-dependent type 2 diabetes mellitus, uncontrolled  Dementia  Failure to thrive  Pseudohyponatremia, resolved  Obesity    Hospital Course:   Chief Complaint: Inpatient follow-up on uncontrolled diabetes mellitus     HPI:   Patient is a 77-year-old white female with IDDM 2 presents to the ER with complaints of persistent hyperglycemia.  She has a history of mild dementia, IDDM 2, hypertension , insomnia and her daughter at bedside assists greatly with the history.  She explains that her PCP is attempting to increase her outpatient regimen of Lantus.  She has been taking 30 units in the morning for quite some time but he recently increased to 60 units in the morning last week.  Despite this change she continues to have readings that are over 400 and often times just read as too high to read.  She does not take short-acting insulin.  On presentation to the ER she was afebrile and hemodynamically stable but her blood glucose was noted to be 700 which did improve with administration of subcutaneous insulin.  Daughter at bedside is concerned because she has to drive to her house every day to administer her morning insulin and she stays there throughout the day to monitor blood sugar.  She also states that the patient does sometimes become lethargic when her blood glucose level drops to the 200s.        Patient is sitting up in bed awake and alert having just eaten lunch.  She is without any acute complaints.  Her daughter and son-in-law are present in the room.  Patient is afebrile, on room air, and hemodynamically stable.  Her capillary blood glucose levels are improved but still suboptimally  controlled.  Her daughter showed me paperwork showing that her primary care provider Dr. Anatoly Priest has been trying to get her admitted to [facility] due to failure to thrive.  She has been in and out of the hospital over the past few months with uncontrolled blood glucose levels and her diabetic regimen has been changed multiple times with the last change being an increase to Lantus 60 units daily.     Patient was managed for uncontrolled blood glucose in a diabetic mellitus patient.           Objective/physical exam:  General:  Elderly obese white female in no acute distress  HENT: normocephalic, atraumatic  Eye: PERRL, EOMI, clear conjunctiva  Neck: full ROM, no thyromegaly, no JVD  Respiratory: clear to auscultation bilaterally  Cardiovascular: regular rate and rhythm  Gastrointestinal: non-distended, positive bowel sounds, non-tender  Musculoskeletal: no gross deformity  Integumentary: warm, dry, intact, no rashes  Neurological: cranial nerves grossly intact, no focal neurological deficit  Psychiatric: cooperative, poor insight into medical condition      Pt was seen and examined on the day of discharge  Vitals:  VITAL SIGNS: 24 HRS MIN & MAX LAST   Temp  Min: 97.2 °F (36.2 °C)  Max: 98.6 °F (37 °C) 97.2 °F (36.2 °C)   BP  Min: 109/51  Max: 149/78 125/85   Pulse  Min: 74  Max: 85  85   Resp  Min: 18  Max: 18 18   SpO2  Min: 93 %  Max: 96 % 96 %       Procedures Performed: No admission procedures for hospital encounter.     Significant Diagnostic Studies: See Full reports for all details    Recent Labs   Lab 06/23/22 2024 06/24/22  0607   WBC 6.5 7.0   RBC 4.77 4.86   HGB 14.0 14.3   HCT 42.5 42.5   MCV 89.1 87.4   MCH 29.4 29.4   MCHC 32.9* 33.6   RDW 13.0 13.1    267   MPV 11.0 10.9       Recent Labs   Lab 06/23/22 2024 06/24/22  0607   * 137   K 4.2 4.1   CO2 25 23   BUN 19.2 14.0   CREATININE 1.29* 0.77   CALCIUM 9.8 9.4   ALBUMIN 3.6 3.4   ALKPHOS 159* 140   ALT 49 44    AST 37* 41*   BILITOT 0.5 0.5        Microbiology Results (last 7 days)     ** No results found for the last 168 hours. **           X-Ray Chest PA And Lateral     CLINICAL:  Cough.     COMPARISON: August 1, 2021.     FINDINGS:  Cardiopericardial silhouette is within normal limits.  No  acute dense focal or segmental consolidation, congestion, pleural  effusion or pneumothorax.       IMPRESSION:     No acute cardiopulmonary process identified.       Electronically Signed By: Evan Meza MD  Date/Time Signed: 03/18/2022 10:37         Medication List      START taking these medications    insulin aspart U-100 100 unit/mL injection  Commonly known as: NovoLOG  Inject 6 Units into the skin 3 (three) times daily.     insulin detemir U-100 100 unit/mL injection  Commonly known as: Levemir  Inject 70 Units into the skin every evening.        CONTINUE taking these medications    citalopram 40 MG tablet  Commonly known as: CeleXA     hydrOXYzine HCL 25 MG tablet  Commonly known as: ATARAX     melatonin 10 mg Tab     multivitamin capsule     pantoprazole 40 MG tablet  Commonly known as: PROTONIX     pramipexole 0.75 MG tablet  Commonly known as: MIRAPEX     traZODone 50 MG tablet  Commonly known as: DESYREL           Where to Get Your Medications      These medications were sent to [facility] [place] [address]  [address] [place] [address] [address] [address]    Phone: [phone]   · insulin aspart U-100 100 unit/mL injection  · insulin detemir U-100 100 unit/mL injection          Explained in detail to the patient about the discharge plan, medications, and follow-up visits. Pt understands and agrees with the treatment plan  Discharge Disposition:    Discharged Condition: stable  Diet-   Dietary Orders (From admission, onward)     Start     Ordered    06/24/22 1823  Diet diabetic  Diet effective now         06/24/22 1824               Medications Per DC med rec  Activities as  tolerated   Follow-up Information     Anatoly Priest MD. Schedule an appointment as soon as possible for a visit in 2 week(s).    Specialty: Internal Medicine  Contact information:  [address] [place] [id number]  [phone]                       For further questions contact hospitalist office    Discharge time 33 minutes    For worsening symptoms, chest pain, shortness of breath, increased abdominal pain, high grade fever, stroke or stroke like symptoms, immediately go to the nearest Emergency Room or call 911 as soon as possible.      Carlos Agosto M.D on 6/30/2022. at 1:37 PM.

## 2022-06-30 NOTE — PLAN OF CARE
Problem: Adult Inpatient Plan of Care  Goal: Patient-Specific Goal (Individualized)  Outcome: Ongoing, Progressing  Goal: Absence of Hospital-Acquired Illness or Injury  Outcome: Ongoing, Progressing  Goal: Optimal Comfort and Wellbeing  Outcome: Ongoing, Progressing  Goal: Readiness for Transition of Care  Outcome: Ongoing, Progressing

## 2022-06-30 NOTE — PT/OT/SLP PROGRESS
Physical Therapy      Patient Name:  Jamie Barnes   MRN:  [id number]    Patient not seen today secondary to Patient unwilling to participate, Other (Comment) (Pt discharging this PM.).

## 2022-06-30 NOTE — PROGRESS NOTES
"Nutrition   Progress Note      Recommendations:  1. Continue diabetic diet as tolerated.   2. Monitor wt, labs, and intake.       Reason for Evaluation:  Identified at risk by screening criteria, RD f/u    Diagnosis:    1. Hyperglycemia    2. Uncontrolled type 2 diabetes mellitus with hyperglycemia        Relevant Medical History:    Past Medical History:   Diagnosis Date    Depression     Diabetes mellitus     Hx of cataract surgery     Hx of psychiatric care     Hypertension     Mixed hyperlipidemia     Psychiatric problem          Nutrition Diet History:    Factors affecting nutritional intake: none identified at this time        Nutrition Prescription Ordered:    Current Diet Order: Diabetic diet    Appetite:  good    PO intake: 75 - 100 %      Labs / Medications / Procedures:    Nutrition Related Medications: SSI, Protonix      Nutrition Related Labs:  6/30: no new labs noted; 6/24: Gluc 276      Anthropometrics:  Height: 5' 4.02" (1.626 m)  Admit Weight:  Weight: 72.6 kg (160 lb)  Latest Weight:  72.6 kg (160 lb)  Wt Readings from Last 5 Encounters:   06/25/22 72.6 kg (160 lb)   6/30/22: no new wts noted    IBW: 54.54kg   %IBW: 133%  UBW: 72.72kg (160 lbs)   %Weight Change: -0.16  Body mass index is 27.45 kg/m².  BMI classification:  Normal (BMI 18.5 - 24.9)      Nutrition Narrative:  6/30: Pt continues with good intake/appetite; denies n/v; does not have any further questions about diet.   6/24: Pt states good PO intakes. Provided diabetic diet education + education materials.     Monitoring and Evaluation:    Nutrition Monitoring and Evaluation:  food and beverage intake    Nutrition Risk:  Level of Nutrition Risk:  Low  Frequency of Follow up:  Dietitian will f/up within 7 days.          "

## 2022-10-03 PROBLEM — Z00.8 ENCOUNTER FOR PSYCHOLOGICAL EVALUATION: Status: RESOLVED | Noted: 2022-06-29 | Resolved: 2022-10-03

## 2024-04-22 NOTE — PT/OT/SLP PROGRESS
EXERCISE SESSION DETAIL   Physical Therapy         Treatment        Jamie Barnes   MRN: [id number]     PT Received On: 06/28/22  PT Start Time: 0946     PT Stop Time: 1009    PT Total Time (min): 23 min       Billable Minutes:  Gait [id number] and Therapeutic Activity 13  Total Minutes: 23    Treatment Type: Treatment  PT/PTA: PTA     PTA Visit Number: 2       General Precautions: Standard,    Orthopedic Precautions:     Braces:           Subjective:  Communicated with nurse prior to session.         Objective:  Patient found supine in bed, with Patient found with: telemetry    Functional Mobility:  Bed Mobility:   Supine to sit: Minimal Assistance   Sit to supine: Minimal Assistance   Rolling: Minimal Assistance   Scooting: Minimal Assistance    Balance:   Static Sit: FAIR+: Able to take MINIMAL challenges from all directions  Dynamic Sit:  FAIR+: Maintains balance through MINIMAL excursions of active trunk motion  Static Stand: POOR+: Needs MINIMAL assist to maintain  Dynamic stand: POOR+: Needs MIN (minimal ) assist during gait    Transfer Training:  Sit to stand:Minimal Assistance with Rolling Walker x4 trials    Gait Training:  Patient gait trained   35  feet on level tile with Rolling Walker with Minimal Assistance.  Pt with demonstarting a  reciprocal gait with decreased trinity and decreased step length.Impairments contributing to gait deviations include impaired balance      Additional Treatment:  Pt performed static standing x2 minutes while pericare was performed.     Activity Tolerance:  Patient tolerated treatment well    Patient left in shower with CNA with all lines intact and CNA present.    Assessment:  Jamie Barnes is a 77 y.o. female with a medical diagnosis of Hyperglycemia. She presents with increased endurance.    Rehab potential is good.    Activity tolerance: Good    Discharge recommendations: Discharge Facility/Level of Care Needs: nursing facility, skilled     Equipment recommendations: Equipment Needed After  Discharge: walker, rolling     GOALS:   Multidisciplinary Problems     Physical Therapy Goals        Problem: Physical Therapy    Goal Priority Disciplines Outcome Goal Variances Interventions   Physical Therapy Goal     PT, PT/OT      Description: LTGs  1. Pt will be ind with bed mobilty  2. Pt will be ind with transfers with rw  3. Pt will be sba with rw 100ft                   PLAN:    Patient to be seen daily  to address the above listed problems via gait training, therapeutic activities  Plan of Care expires: 06/25/22  Plan of Care reviewed with: patient         6/28/2022